# Patient Record
Sex: MALE | Race: WHITE | Employment: FULL TIME | URBAN - METROPOLITAN AREA
[De-identification: names, ages, dates, MRNs, and addresses within clinical notes are randomized per-mention and may not be internally consistent; named-entity substitution may affect disease eponyms.]

---

## 2020-12-08 ENCOUNTER — APPOINTMENT (OUTPATIENT)
Dept: CT IMAGING | Facility: CLINIC | Age: 30
DRG: 385 | End: 2020-12-08
Attending: EMERGENCY MEDICINE
Payer: COMMERCIAL

## 2020-12-08 ENCOUNTER — HOSPITAL ENCOUNTER (INPATIENT)
Facility: CLINIC | Age: 30
LOS: 10 days | Discharge: HOME | DRG: 385 | End: 2020-12-18
Attending: EMERGENCY MEDICINE | Admitting: STUDENT IN AN ORGANIZED HEALTH CARE EDUCATION/TRAINING PROGRAM
Payer: COMMERCIAL

## 2020-12-08 DIAGNOSIS — D62 ANEMIA DUE TO BLOOD LOSS, ACUTE: ICD-10-CM

## 2020-12-08 DIAGNOSIS — K92.2 GASTROINTESTINAL HEMORRHAGE, UNSPECIFIED GASTROINTESTINAL HEMORRHAGE TYPE: ICD-10-CM

## 2020-12-08 LAB
ALBUMIN SERPL-MCNC: 1.8 G/DL (ref 3.4–5)
ALP SERPL-CCNC: 106 U/L (ref 40–150)
ALT SERPL W P-5'-P-CCNC: 16 U/L (ref 0–70)
ANION GAP SERPL CALCULATED.3IONS-SCNC: 6 MMOL/L (ref 3–14)
APTT PPP: 34 SEC (ref 22–37)
AST SERPL W P-5'-P-CCNC: 12 U/L (ref 0–45)
BASE DEFICIT BLDV-SCNC: 2.5 MMOL/L
BASOPHILS # BLD AUTO: 0 10E9/L (ref 0–0.2)
BASOPHILS NFR BLD AUTO: 0.1 %
BILIRUB DIRECT SERPL-MCNC: <0.1 MG/DL (ref 0–0.2)
BILIRUB SERPL-MCNC: 0.1 MG/DL (ref 0.2–1.3)
BLD PROD TYP BPU: NORMAL
BLD PROD TYP BPU: NORMAL
BLD UNIT ID BPU: 0
BLD UNIT ID BPU: 0
BLOOD PRODUCT CODE: NORMAL
BLOOD PRODUCT CODE: NORMAL
BPU ID: NORMAL
BPU ID: NORMAL
BUN SERPL-MCNC: 17 MG/DL (ref 7–30)
CALCIUM SERPL-MCNC: 8.4 MG/DL (ref 8.5–10.1)
CHLORIDE SERPL-SCNC: 102 MMOL/L (ref 94–109)
CO2 SERPL-SCNC: 26 MMOL/L (ref 20–32)
CREAT SERPL-MCNC: 1.05 MG/DL (ref 0.66–1.25)
DIFFERENTIAL METHOD BLD: ABNORMAL
EOSINOPHIL # BLD AUTO: 0 10E9/L (ref 0–0.7)
EOSINOPHIL NFR BLD AUTO: 0.1 %
ERYTHROCYTE [DISTWIDTH] IN BLOOD BY AUTOMATED COUNT: 20.5 % (ref 10–15)
FERRITIN SERPL-MCNC: 2 NG/ML (ref 26–388)
GFR SERPL CREATININE-BSD FRML MDRD: >90 ML/MIN/{1.73_M2}
GLUCOSE SERPL-MCNC: 118 MG/DL (ref 70–99)
HCO3 BLDV-SCNC: 22 MMOL/L (ref 21–28)
HCT VFR BLD AUTO: 10.9 % (ref 40–53)
HEMOCCULT STL QL: POSITIVE
HGB BLD-MCNC: 3.1 G/DL (ref 13.3–17.7)
IMM GRANULOCYTES # BLD: 0.1 10E9/L (ref 0–0.4)
IMM GRANULOCYTES NFR BLD: 0.5 %
INR PPP: 1.1 (ref 0.86–1.14)
IRON SATN MFR SERPL: 2 % (ref 15–46)
IRON SERPL-MCNC: 8 UG/DL (ref 35–180)
LDH SERPL L TO P-CCNC: 144 U/L (ref 85–227)
LIPASE SERPL-CCNC: 29 U/L (ref 73–393)
LYMPHOCYTES # BLD AUTO: 1.7 10E9/L (ref 0.8–5.3)
LYMPHOCYTES NFR BLD AUTO: 13.5 %
MCH RBC QN AUTO: 19 PG (ref 26.5–33)
MCHC RBC AUTO-ENTMCNC: 28.4 G/DL (ref 31.5–36.5)
MCV RBC AUTO: 67 FL (ref 78–100)
MONOCYTES # BLD AUTO: 1.4 10E9/L (ref 0–1.3)
MONOCYTES NFR BLD AUTO: 10.7 %
NEUTROPHILS # BLD AUTO: 9.6 10E9/L (ref 1.6–8.3)
NEUTROPHILS NFR BLD AUTO: 75.1 %
NRBC # BLD AUTO: 0.2 10*3/UL
NRBC BLD AUTO-RTO: 2 /100
O2/TOTAL GAS SETTING VFR VENT: ABNORMAL %
PCO2 BLDV: 34 MM HG (ref 40–50)
PH BLDV: 7.42 PH (ref 7.32–7.43)
PLATELET # BLD AUTO: 622 10E9/L (ref 150–450)
PO2 BLDV: 17 MM HG (ref 25–47)
POTASSIUM SERPL-SCNC: 3.9 MMOL/L (ref 3.4–5.3)
PROT SERPL-MCNC: 6.9 G/DL (ref 6.8–8.8)
RBC # BLD AUTO: 1.63 10E12/L (ref 4.4–5.9)
SODIUM SERPL-SCNC: 134 MMOL/L (ref 133–144)
TIBC SERPL-MCNC: 347 UG/DL (ref 240–430)
TRANSFUSION STATUS PATIENT QL: NORMAL
WBC # BLD AUTO: 12.8 10E9/L (ref 4–11)

## 2020-12-08 PROCEDURE — 86900 BLOOD TYPING SEROLOGIC ABO: CPT | Performed by: EMERGENCY MEDICINE

## 2020-12-08 PROCEDURE — 82272 OCCULT BLD FECES 1-3 TESTS: CPT | Performed by: EMERGENCY MEDICINE

## 2020-12-08 PROCEDURE — 85025 COMPLETE CBC W/AUTO DIFF WBC: CPT | Performed by: EMERGENCY MEDICINE

## 2020-12-08 PROCEDURE — 86870 RBC ANTIBODY IDENTIFICATION: CPT | Performed by: EMERGENCY MEDICINE

## 2020-12-08 PROCEDURE — 86905 BLOOD TYPING RBC ANTIGENS: CPT | Performed by: EMERGENCY MEDICINE

## 2020-12-08 PROCEDURE — 86880 COOMBS TEST DIRECT: CPT | Performed by: EMERGENCY MEDICINE

## 2020-12-08 PROCEDURE — 83010 ASSAY OF HAPTOGLOBIN QUANT: CPT | Performed by: STUDENT IN AN ORGANIZED HEALTH CARE EDUCATION/TRAINING PROGRAM

## 2020-12-08 PROCEDURE — 85730 THROMBOPLASTIN TIME PARTIAL: CPT | Performed by: EMERGENCY MEDICINE

## 2020-12-08 PROCEDURE — 86902 BLOOD TYPE ANTIGEN DONOR EA: CPT | Performed by: EMERGENCY MEDICINE

## 2020-12-08 PROCEDURE — 80076 HEPATIC FUNCTION PANEL: CPT | Performed by: EMERGENCY MEDICINE

## 2020-12-08 PROCEDURE — 87506 IADNA-DNA/RNA PROBE TQ 6-11: CPT | Performed by: STUDENT IN AN ORGANIZED HEALTH CARE EDUCATION/TRAINING PROGRAM

## 2020-12-08 PROCEDURE — 99285 EMERGENCY DEPT VISIT HI MDM: CPT | Mod: 25

## 2020-12-08 PROCEDURE — 36430 TRANSFUSION BLD/BLD COMPNT: CPT

## 2020-12-08 PROCEDURE — 83690 ASSAY OF LIPASE: CPT | Performed by: EMERGENCY MEDICINE

## 2020-12-08 PROCEDURE — 250N000009 HC RX 250: Performed by: EMERGENCY MEDICINE

## 2020-12-08 PROCEDURE — 80048 BASIC METABOLIC PNL TOTAL CA: CPT | Performed by: EMERGENCY MEDICINE

## 2020-12-08 PROCEDURE — 83540 ASSAY OF IRON: CPT | Performed by: EMERGENCY MEDICINE

## 2020-12-08 PROCEDURE — 74177 CT ABD & PELVIS W/CONTRAST: CPT

## 2020-12-08 PROCEDURE — U0003 INFECTIOUS AGENT DETECTION BY NUCLEIC ACID (DNA OR RNA); SEVERE ACUTE RESPIRATORY SYNDROME CORONAVIRUS 2 (SARS-COV-2) (CORONAVIRUS DISEASE [COVID-19]), AMPLIFIED PROBE TECHNIQUE, MAKING USE OF HIGH THROUGHPUT TECHNOLOGIES AS DESCRIBED BY CMS-2020-01-R: HCPCS | Performed by: EMERGENCY MEDICINE

## 2020-12-08 PROCEDURE — 85610 PROTHROMBIN TIME: CPT | Performed by: EMERGENCY MEDICINE

## 2020-12-08 PROCEDURE — 86850 RBC ANTIBODY SCREEN: CPT | Performed by: EMERGENCY MEDICINE

## 2020-12-08 PROCEDURE — 82803 BLOOD GASES ANY COMBINATION: CPT | Performed by: EMERGENCY MEDICINE

## 2020-12-08 PROCEDURE — 99207 PR CDG-MDM COMPONENT: MEETS MODERATE - UP CODED: CPT | Performed by: STUDENT IN AN ORGANIZED HEALTH CARE EDUCATION/TRAINING PROGRAM

## 2020-12-08 PROCEDURE — 86901 BLOOD TYPING SEROLOGIC RH(D): CPT | Performed by: EMERGENCY MEDICINE

## 2020-12-08 PROCEDURE — 120N000001 HC R&B MED SURG/OB

## 2020-12-08 PROCEDURE — 82728 ASSAY OF FERRITIN: CPT | Performed by: EMERGENCY MEDICINE

## 2020-12-08 PROCEDURE — 99223 1ST HOSP IP/OBS HIGH 75: CPT | Mod: AI | Performed by: STUDENT IN AN ORGANIZED HEALTH CARE EDUCATION/TRAINING PROGRAM

## 2020-12-08 PROCEDURE — 86922 COMPATIBILITY TEST ANTIGLOB: CPT | Performed by: EMERGENCY MEDICINE

## 2020-12-08 PROCEDURE — C9803 HOPD COVID-19 SPEC COLLECT: HCPCS

## 2020-12-08 PROCEDURE — 83550 IRON BINDING TEST: CPT | Performed by: EMERGENCY MEDICINE

## 2020-12-08 PROCEDURE — 250N000011 HC RX IP 250 OP 636: Performed by: EMERGENCY MEDICINE

## 2020-12-08 PROCEDURE — 86860 RBC ANTIBODY ELUTION: CPT | Performed by: EMERGENCY MEDICINE

## 2020-12-08 PROCEDURE — 83010 ASSAY OF HAPTOGLOBIN QUANT: CPT | Performed by: EMERGENCY MEDICINE

## 2020-12-08 PROCEDURE — 83615 LACTATE (LD) (LDH) ENZYME: CPT | Performed by: EMERGENCY MEDICINE

## 2020-12-08 PROCEDURE — P9016 RBC LEUKOCYTES REDUCED: HCPCS | Performed by: EMERGENCY MEDICINE

## 2020-12-08 RX ORDER — AMOXICILLIN 250 MG
1 CAPSULE ORAL 2 TIMES DAILY
Status: DISCONTINUED | OUTPATIENT
Start: 2020-12-08 | End: 2020-12-08

## 2020-12-08 RX ORDER — LIDOCAINE 40 MG/G
CREAM TOPICAL
Status: DISCONTINUED | OUTPATIENT
Start: 2020-12-08 | End: 2020-12-18 | Stop reason: HOSPADM

## 2020-12-08 RX ORDER — NALOXONE HYDROCHLORIDE 0.4 MG/ML
0.2 INJECTION, SOLUTION INTRAMUSCULAR; INTRAVENOUS; SUBCUTANEOUS
Status: DISCONTINUED | OUTPATIENT
Start: 2020-12-08 | End: 2020-12-18 | Stop reason: HOSPADM

## 2020-12-08 RX ORDER — HYDROCODONE BITARTRATE AND ACETAMINOPHEN 5; 325 MG/1; MG/1
1-2 TABLET ORAL EVERY 4 HOURS PRN
Status: DISCONTINUED | OUTPATIENT
Start: 2020-12-08 | End: 2020-12-18 | Stop reason: HOSPADM

## 2020-12-08 RX ORDER — ALBUMIN (HUMAN) 12.5 G/50ML
25 SOLUTION INTRAVENOUS ONCE
Status: COMPLETED | OUTPATIENT
Start: 2020-12-08 | End: 2020-12-09

## 2020-12-08 RX ORDER — IOPAMIDOL 755 MG/ML
66 INJECTION, SOLUTION INTRAVASCULAR ONCE
Status: COMPLETED | OUTPATIENT
Start: 2020-12-08 | End: 2020-12-08

## 2020-12-08 RX ORDER — NALOXONE HYDROCHLORIDE 0.4 MG/ML
0.4 INJECTION, SOLUTION INTRAMUSCULAR; INTRAVENOUS; SUBCUTANEOUS
Status: DISCONTINUED | OUTPATIENT
Start: 2020-12-08 | End: 2020-12-18 | Stop reason: HOSPADM

## 2020-12-08 RX ORDER — AMOXICILLIN 250 MG
2 CAPSULE ORAL 2 TIMES DAILY
Status: DISCONTINUED | OUTPATIENT
Start: 2020-12-08 | End: 2020-12-08

## 2020-12-08 RX ORDER — POLYETHYLENE GLYCOL 3350 17 G/17G
17 POWDER, FOR SOLUTION ORAL DAILY
Status: DISCONTINUED | OUTPATIENT
Start: 2020-12-09 | End: 2020-12-08

## 2020-12-08 RX ADMIN — SODIUM CHLORIDE 60 ML: 9 INJECTION, SOLUTION INTRAVENOUS at 17:42

## 2020-12-08 RX ADMIN — IOPAMIDOL 66 ML: 755 INJECTION, SOLUTION INTRAVENOUS at 17:42

## 2020-12-08 ASSESSMENT — ENCOUNTER SYMPTOMS
DIZZINESS: 0
HEADACHES: 0
FREQUENCY: 0
SHORTNESS OF BREATH: 0
APPETITE CHANGE: 1
FATIGUE: 1
CHILLS: 1
NAUSEA: 1
FEVER: 0
FLANK PAIN: 0
ABDOMINAL PAIN: 0
VOMITING: 0
ACTIVITY CHANGE: 1
DIARRHEA: 1
LIGHT-HEADEDNESS: 0

## 2020-12-08 NOTE — ED NOTES
DATE:  12/8/2020   TIME OF RECEIPT FROM LAB:  2224   LAB TEST:  Antibody screen  LAB VALUE:  Positive antibody screen  RESULTS GIVEN WITH READ-BACK TO (PROVIDER):  Payam Sampson,   TIME LAB VALUE REPORTED TO PROVIDER:   4882

## 2020-12-08 NOTE — ED TRIAGE NOTES
Patient states he had a hemoglobin of 3 today. States he has some hemorrhoids that bleed at times but denies dark, tarry stools.

## 2020-12-08 NOTE — ED PROVIDER NOTES
History   Chief Complaint  Black diarrheal stools    HPI   Chris Siddiqi is a 30 year old male with a history of pancreatic insufficiency who presents for evaluation of low hemoglobin. Patient has been followed by [facility] due to chronic pancreatitis and had a virtual visit on 11/18 and then again today. The patient has had black, diarrheal stools since 11/16 (3 weeks and one day), with up to 20 episodes of diarrhea per day. He states that he has had progressive weakness and fatigue over this time period. No fevers but has had chills. No chest pain or shortness of breath. He has also noticed that his eyelids and mouth are pale. Patient was told to go to get labs done after his virtual visit and his hemoglobin came back at 3.1    Labs from 12/8/2020 at [facility]:  CBC: WBC 12.8 (H), HGB 3. 1 (L),  (H) o/w WNL    Allergies  No Known Allergies    Medications  The patient is not currently taking any prescribed medications.    Past Medical History  Pancreatic insufficiency    Past Surgical History   History reviewed.  No pertinent past surgical history.    Family History  History reviewed. No pertinent family history.    Social History  The patient was unaccompanied to the ED.  Smoking Status: never  Smokeless Tobacco: never  Alcohol Use: yes  Drug Use: no    Review of Systems   Constitutional: Positive for activity change, appetite change, chills and fatigue. Negative for fever.   HENT: Negative for congestion.    Respiratory: Negative for shortness of breath.    Cardiovascular: Negative for chest pain.   Gastrointestinal: Positive for diarrhea and nausea. Negative for abdominal pain and vomiting.   Genitourinary: Negative for flank pain, frequency and urgency.   Neurological: Negative for dizziness, light-headedness and headaches.       Physical Exam     Patient Vitals for the past 24 hrs:   BP Temp Temp src Pulse Resp SpO2   12/08/20 1845 114/59 -- -- 107 18 100 %   12/08/20 1830 -- -- -- 116 12 100 %   12/08/20  1815 109/62 -- -- 110 12 100 %   12/08/20 1800 118/65 -- -- 109 12 100 %   12/08/20 1730 115/63 -- -- 109 9 96 %   12/08/20 1715 124/47 -- -- 140 -- --   12/08/20 1700 107/61 -- -- 112 -- 100 %   12/08/20 1643 113/40 98.3  F (36.8  C) Oral 138 18 97 %       Physical Exam  Constitutional: Alert, appears ill and quite pale.   HENT:    Nose: Nose normal.    Mouth/Throat: Oropharynx is clear, mucous membranes are moist, pale mucosa.   Eyes: Pale conjunctivae. EOM are normal. Pupils are equal, round, and reactive to light.   CV: Tachycardic rate and regular rhythm, no murmurs, rubs or gallops.  Resp: Clear lungs to auscultation, all lung fields. Normal respiratory effort.   GI: Mild diffuse abdominal tenderness. Soft, non-distended. No rebound or guarding.   MSK: Normal range of motion. No deformity.   Neurological:   A/Ox3  5/5 strength is symmetric to the upper and lower extremities;   Sensation intact to light touch throughout the upper and lower extremities;   Skin: Skin is warm and dry.    Emergency Department Course   Imaging:  Radiology findings were communicated with the patient who voiced understanding of the findings.    CT abdomen pelvis w contrast:  IMPRESSION:   1. Although the colon is not distended, there is diffuse colonic wall   thickening, which can be seen with colitis.   2. Multiple bibasilar pulmonary nodules, of indeterminate etiology,   could be infectious or less likely neoplastic given the lack of   patient's history of malignancy. Recommend follow-up exam in 3 months   to ensure resolution.   3. Atrophic appearance of the pancreas of indeterminate etiology.     Readings per Radiology    Laboratory:  Laboratory findings were communicated with the patient who voiced understanding of the findings.    PTT: 34  Lipase: 29 (L)  INR: 1.10     Hepatic panel: bilirubin 0.1 (L), albumin 1.8 (L) o/w WNL    Blood type: type A, Rh positive, antibody positive    BMP: glucose 118 (H), calcium 8.4 (L) o/w WNL  (Creatinine 1.05)  CBC: WBC 12.8 (H), HGB 3.1 (L),  (H)     Occult blood stool: positive    BGV: pCO2 34 (L), pO2 17 (L) o/w WNL    Interventions:  2 units of typed and crossed PRBC's    Emergency Department Course:  Past medical records, nursing notes, and vitals reviewed.    1646 I physically examined the patient as documented above.    A Nasopharyngeal swab was obtained here in the ED.    IV was inserted and blood was drawn for laboratory testing, results above.    The patient was sent for radiographs while in the emergency department, results above.     I rechecked the patient and discussed the findings of their workup thus far.    183 I spoke to Dr. Santiago of hospitalist service.      Findings and plan explained to the Patient who consents to admission. Discussed the patient with Dr. Santiago, who will admit the patient to a [facility] telemetry bed for further monitoring, evaluation, and treatment.    I personally reviewed the laboratory and imaging results with the Patient and answered all related questions prior to admission.     Impression & Plan   Covid-19  Chris Siddiqi was evaluated during a global COVID-19 pandemic, which necessitated consideration that the patient might be at risk for infection with the SARS-CoV-2 virus that causes COVID-19.   Applicable protocols for evaluation were followed during the patient's care.   COVID-19 was considered as part of the patient's evaluation. The plan for testing is:  a test was obtained during this visit.    Medical Decision Makin year old male who comes in with low hemoglobin. Exam as above. Labs obtained. Hemoglobin 3.1 again demonstrated. Blood pressure 114/59 and so I did not start uncrossed blood but waited for type and screen. It was positive for antibiotics and the blood was then started. We started 2 U PRBC once it was available. CT abdomen/pelvis did not demonstrate any acute bleeding but did show colitis which is likely the source given  his positive hemoccult. This is likely a slow bleed given his compensated shock state. Discussed the patient with Dr. Santiago who was amenable to admission.    Critical care time exclusive of procedures: 37 minutes    Diagnosis:    ICD-10-CM    1. Gastrointestinal hemorrhage, unspecified gastrointestinal hemorrhage type  K92.2 ABO/Rh type and screen     Blood component     Blood component     Blood component     Blood component     Antibody ID [facility] BB     Antibody ID [facility] BB   2. Anemia due to blood loss, acute  D62      Disposition:  Admitted to [facility] under care of Dr. Santiago.    Scribe Disclosure:  Tata ESPINOZA, am serving as a scribe at 4:44 PM on 12/8/2020 to document services personally performed by Payam Sampson DO based on my observations and the provider's statements to me.      Payam Sampson DO  12/08/20 1930

## 2020-12-08 NOTE — ED NOTES
"Pt lined in triage and blood sent for analysis asap. Pt presents with very pale skin, pale mucous membranes, and weak/tired.   Pt had gone to GI clinic today and had labs drawn with a reported HGB of 3.1 Pt lives alone, no sig other, last physical contact where someone has seen him hs been 1 month ago. I asked pt how long he has been so pale to which he replied \" I did not know that I was pale. \"     Pt stated no outward s/s of bleeding other than minor hemorrhoids to which he was seeing GI for he stated , pt also has some sort of poor working pancreas per report. VSS in lieu of tachy    "

## 2020-12-09 ENCOUNTER — ANESTHESIA (OUTPATIENT)
Dept: ONCOLOGY | Facility: CLINIC | Age: 30
End: 2020-12-09

## 2020-12-09 LAB
ABO + RH BLD: ABNORMAL
ABO + RH BLD: ABNORMAL
ABO + RH BLD: NORMAL
ABO + RH BLD: NORMAL
ALBUMIN SERPL-MCNC: 1.8 G/DL (ref 3.4–5)
ALP SERPL-CCNC: 81 U/L (ref 40–150)
ALT SERPL W P-5'-P-CCNC: 12 U/L (ref 0–70)
ANION GAP SERPL CALCULATED.3IONS-SCNC: 3 MMOL/L (ref 3–14)
AST SERPL W P-5'-P-CCNC: 13 U/L (ref 0–45)
BASOPHILS # BLD AUTO: 0 10E9/L (ref 0–0.2)
BASOPHILS NFR BLD AUTO: 0.2 %
BILIRUB SERPL-MCNC: 0.4 MG/DL (ref 0.2–1.3)
BLD GP AB INVEST PLASRBC-IMP: ABNORMAL
BLD GP AB INVEST PLASRBC-IMP: NORMAL
BLD GP AB SCN SERPL QL ELUTION: NORMAL
BLD GP AB SCN SERPL QL: ABNORMAL
BLD PROD TYP BPU: ABNORMAL
BLD PROD TYP BPU: NORMAL
BLD PROD TYP BPU: NORMAL
BLD UNIT ID BPU: 0
BLD UNIT ID BPU: 0
BLOOD BANK CMNT PATIENT-IMP: ABNORMAL
BLOOD BANK CMNT PATIENT-IMP: ABNORMAL
BLOOD BANK CMNT PATIENT-IMP: NORMAL
BLOOD BANK CMNT PATIENT-IMP: NORMAL
BLOOD PRODUCT CODE: NORMAL
BLOOD PRODUCT CODE: NORMAL
BPU ID: NORMAL
BPU ID: NORMAL
BUN SERPL-MCNC: 11 MG/DL (ref 7–30)
C COLI+JEJUNI+LARI FUSA STL QL NAA+PROBE: NOT DETECTED
CALCIUM SERPL-MCNC: 7.8 MG/DL (ref 8.5–10.1)
CHLORIDE SERPL-SCNC: 104 MMOL/L (ref 94–109)
CO2 SERPL-SCNC: 26 MMOL/L (ref 20–32)
CREAT SERPL-MCNC: 0.93 MG/DL (ref 0.66–1.25)
CRP SERPL-MCNC: 24.6 MG/L (ref 0–8)
DAT C3-SP REAG RBC QL: NORMAL
DAT IGG-SP REAG RBC-IMP: ABNORMAL
DAT IGG-SP REAG RBC-IMP: NORMAL
DAT POLY-SP REAG RBC QL: NORMAL
DIFFERENTIAL METHOD BLD: ABNORMAL
EC STX1 GENE STL QL NAA+PROBE: NOT DETECTED
EC STX2 GENE STL QL NAA+PROBE: NOT DETECTED
ENTERIC PATHOGEN COMMENT: NORMAL
EOSINOPHIL # BLD AUTO: 0 10E9/L (ref 0–0.7)
EOSINOPHIL NFR BLD AUTO: 0.2 %
ERYTHROCYTE [DISTWIDTH] IN BLOOD BY AUTOMATED COUNT: 20.4 % (ref 10–15)
FERRITIN SERPL-MCNC: 3 NG/ML (ref 26–388)
FOLATE SERPL-MCNC: 25.7 NG/ML
GFR SERPL CREATININE-BSD FRML MDRD: >90 ML/MIN/{1.73_M2}
GLUCOSE SERPL-MCNC: 91 MG/DL (ref 70–99)
HAPTOGLOB SERPL-MCNC: 433 MG/DL (ref 32–197)
HCT VFR BLD AUTO: 13.9 % (ref 40–53)
HGB BLD-MCNC: 4.5 G/DL (ref 13.3–17.7)
HGB BLD-MCNC: 7.4 G/DL (ref 13.3–17.7)
HGB BLD-MCNC: 8.5 G/DL (ref 13.3–17.7)
IMM GRANULOCYTES # BLD: 0 10E9/L (ref 0–0.4)
IMM GRANULOCYTES NFR BLD: 0.5 %
IRON SATN MFR SERPL: 4 % (ref 15–46)
IRON SERPL-MCNC: 10 UG/DL (ref 35–180)
LABORATORY COMMENT REPORT: NORMAL
LDH SERPL L TO P-CCNC: 121 U/L (ref 85–227)
LYMPHOCYTES # BLD AUTO: 1.5 10E9/L (ref 0.8–5.3)
LYMPHOCYTES NFR BLD AUTO: 17.9 %
MCH RBC QN AUTO: 23.8 PG (ref 26.5–33)
MCHC RBC AUTO-ENTMCNC: 32.4 G/DL (ref 31.5–36.5)
MCV RBC AUTO: 74 FL (ref 78–100)
MONOCYTES # BLD AUTO: 1 10E9/L (ref 0–1.3)
MONOCYTES NFR BLD AUTO: 12.5 %
NEUTROPHILS # BLD AUTO: 5.7 10E9/L (ref 1.6–8.3)
NEUTROPHILS NFR BLD AUTO: 68.7 %
NOROV GI+II ORF1-ORF2 JNC STL QL NAA+PR: NOT DETECTED
NRBC # BLD AUTO: 0.1 10*3/UL
NRBC BLD AUTO-RTO: 1 /100
NUM BPU REQUESTED: 9
PLATELET # BLD AUTO: 399 10E9/L (ref 150–450)
POTASSIUM SERPL-SCNC: 4.1 MMOL/L (ref 3.4–5.3)
PROT SERPL-MCNC: 5.6 G/DL (ref 6.8–8.8)
RBC # BLD AUTO: 1.89 10E12/L (ref 4.4–5.9)
RVA NSP5 STL QL NAA+PROBE: NOT DETECTED
SALMONELLA SP RPOD STL QL NAA+PROBE: NOT DETECTED
SARS-COV-2 RNA SPEC QL NAA+PROBE: NEGATIVE
SARS-COV-2 RNA SPEC QL NAA+PROBE: NORMAL
SHIGELLA SP+EIEC IPAH STL QL NAA+PROBE: NOT DETECTED
SODIUM SERPL-SCNC: 133 MMOL/L (ref 133–144)
SPECIMEN EXP DATE BLD: ABNORMAL
SPECIMEN SOURCE: NORMAL
SPECIMEN SOURCE: NORMAL
TIBC SERPL-MCNC: 264 UG/DL (ref 240–430)
TRANSFUSION STATUS PATIENT QL: NORMAL
V CHOL+PARA RFBL+TRKH+TNAA STL QL NAA+PR: NOT DETECTED
VIT B12 SERPL-MCNC: 987 PG/ML (ref 193–986)
WBC # BLD AUTO: 8.3 10E9/L (ref 4–11)
Y ENTERO RECN STL QL NAA+PROBE: NOT DETECTED

## 2020-12-09 PROCEDURE — 82607 VITAMIN B-12: CPT | Performed by: INTERNAL MEDICINE

## 2020-12-09 PROCEDURE — 250N000013 HC RX MED GY IP 250 OP 250 PS 637: Performed by: INTERNAL MEDICINE

## 2020-12-09 PROCEDURE — 93010 ELECTROCARDIOGRAM REPORT: CPT | Performed by: INTERNAL MEDICINE

## 2020-12-09 PROCEDURE — 93005 ELECTROCARDIOGRAM TRACING: CPT

## 2020-12-09 PROCEDURE — 99233 SBSQ HOSP IP/OBS HIGH 50: CPT | Performed by: HOSPITALIST

## 2020-12-09 PROCEDURE — 85018 HEMOGLOBIN: CPT | Performed by: HOSPITALIST

## 2020-12-09 PROCEDURE — 83615 LACTATE (LD) (LDH) ENZYME: CPT | Performed by: STUDENT IN AN ORGANIZED HEALTH CARE EDUCATION/TRAINING PROGRAM

## 2020-12-09 PROCEDURE — 36415 COLL VENOUS BLD VENIPUNCTURE: CPT | Performed by: INTERNAL MEDICINE

## 2020-12-09 PROCEDURE — 36415 COLL VENOUS BLD VENIPUNCTURE: CPT | Performed by: HOSPITALIST

## 2020-12-09 PROCEDURE — 250N000011 HC RX IP 250 OP 636: Performed by: STUDENT IN AN ORGANIZED HEALTH CARE EDUCATION/TRAINING PROGRAM

## 2020-12-09 PROCEDURE — 36415 COLL VENOUS BLD VENIPUNCTURE: CPT | Performed by: STUDENT IN AN ORGANIZED HEALTH CARE EDUCATION/TRAINING PROGRAM

## 2020-12-09 PROCEDURE — 82746 ASSAY OF FOLIC ACID SERUM: CPT | Performed by: INTERNAL MEDICINE

## 2020-12-09 PROCEDURE — 85025 COMPLETE CBC W/AUTO DIFF WBC: CPT | Performed by: HOSPITALIST

## 2020-12-09 PROCEDURE — P9047 ALBUMIN (HUMAN), 25%, 50ML: HCPCS | Performed by: STUDENT IN AN ORGANIZED HEALTH CARE EDUCATION/TRAINING PROGRAM

## 2020-12-09 PROCEDURE — 120N000001 HC R&B MED SURG/OB

## 2020-12-09 PROCEDURE — 250N000011 HC RX IP 250 OP 636: Performed by: HOSPITALIST

## 2020-12-09 PROCEDURE — C9113 INJ PANTOPRAZOLE SODIUM, VIA: HCPCS | Performed by: HOSPITALIST

## 2020-12-09 PROCEDURE — 82728 ASSAY OF FERRITIN: CPT | Performed by: STUDENT IN AN ORGANIZED HEALTH CARE EDUCATION/TRAINING PROGRAM

## 2020-12-09 PROCEDURE — 83550 IRON BINDING TEST: CPT | Performed by: STUDENT IN AN ORGANIZED HEALTH CARE EDUCATION/TRAINING PROGRAM

## 2020-12-09 PROCEDURE — 85018 HEMOGLOBIN: CPT | Performed by: INTERNAL MEDICINE

## 2020-12-09 PROCEDURE — P9016 RBC LEUKOCYTES REDUCED: HCPCS | Performed by: EMERGENCY MEDICINE

## 2020-12-09 PROCEDURE — 83540 ASSAY OF IRON: CPT | Performed by: STUDENT IN AN ORGANIZED HEALTH CARE EDUCATION/TRAINING PROGRAM

## 2020-12-09 PROCEDURE — 80053 COMPREHEN METABOLIC PANEL: CPT | Performed by: STUDENT IN AN ORGANIZED HEALTH CARE EDUCATION/TRAINING PROGRAM

## 2020-12-09 PROCEDURE — 86140 C-REACTIVE PROTEIN: CPT | Performed by: STUDENT IN AN ORGANIZED HEALTH CARE EDUCATION/TRAINING PROGRAM

## 2020-12-09 PROCEDURE — 250N000013 HC RX MED GY IP 250 OP 250 PS 637: Performed by: STUDENT IN AN ORGANIZED HEALTH CARE EDUCATION/TRAINING PROGRAM

## 2020-12-09 RX ORDER — ONDANSETRON 2 MG/ML
4 INJECTION INTRAMUSCULAR; INTRAVENOUS EVERY 30 MIN PRN
Status: CANCELLED | OUTPATIENT
Start: 2020-12-09

## 2020-12-09 RX ORDER — MULTIPLE VITAMINS W/ MINERALS TAB 9MG-400MCG
1 TAB ORAL DAILY
Status: DISCONTINUED | OUTPATIENT
Start: 2020-12-09 | End: 2020-12-18 | Stop reason: HOSPADM

## 2020-12-09 RX ORDER — ONDANSETRON 4 MG/1
4 TABLET, ORALLY DISINTEGRATING ORAL EVERY 30 MIN PRN
Status: CANCELLED | OUTPATIENT
Start: 2020-12-09

## 2020-12-09 RX ORDER — POLYETHYLENE GLYCOL 3350 17 G/17G
238 POWDER, FOR SOLUTION ORAL ONCE
Status: COMPLETED | OUTPATIENT
Start: 2020-12-09 | End: 2020-12-09

## 2020-12-09 RX ORDER — SODIUM CHLORIDE, SODIUM LACTATE, POTASSIUM CHLORIDE, CALCIUM CHLORIDE 600; 310; 30; 20 MG/100ML; MG/100ML; MG/100ML; MG/100ML
INJECTION, SOLUTION INTRAVENOUS CONTINUOUS
Status: CANCELLED | OUTPATIENT
Start: 2020-12-09

## 2020-12-09 RX ORDER — LIDOCAINE 40 MG/G
CREAM TOPICAL
Status: CANCELLED | OUTPATIENT
Start: 2020-12-09

## 2020-12-09 RX ORDER — MAGNESIUM CARB/ALUMINUM HYDROX 105-160MG
296 TABLET,CHEWABLE ORAL ONCE
Status: COMPLETED | OUTPATIENT
Start: 2020-12-10 | End: 2020-12-09

## 2020-12-09 RX ORDER — FENTANYL CITRATE 50 UG/ML
25-50 INJECTION, SOLUTION INTRAMUSCULAR; INTRAVENOUS
Status: CANCELLED | OUTPATIENT
Start: 2020-12-09

## 2020-12-09 RX ADMIN — Medication 1 MG: at 03:29

## 2020-12-09 RX ADMIN — POLYETHYLENE GLYCOL 3350, SODIUM SULFATE ANHYDROUS, SODIUM BICARBONATE, SODIUM CHLORIDE, POTASSIUM CHLORIDE 4000 ML: 236; 22.74; 6.74; 5.86; 2.97 POWDER, FOR SOLUTION ORAL at 09:49

## 2020-12-09 RX ADMIN — ALBUMIN HUMAN 25 G: 0.25 SOLUTION INTRAVENOUS at 01:57

## 2020-12-09 RX ADMIN — MAGNESIUM CITRATE 296 ML: 1.75 LIQUID ORAL at 23:55

## 2020-12-09 RX ADMIN — PANTOPRAZOLE SODIUM 40 MG: 40 INJECTION, POWDER, FOR SOLUTION INTRAVENOUS at 09:49

## 2020-12-09 RX ADMIN — Medication 1 MG: at 23:55

## 2020-12-09 RX ADMIN — POLYETHYLENE GLYCOL 3350 238 G: 17 POWDER, FOR SOLUTION ORAL at 14:09

## 2020-12-09 RX ADMIN — PANTOPRAZOLE SODIUM 40 MG: 40 INJECTION, POWDER, FOR SOLUTION INTRAVENOUS at 21:16

## 2020-12-09 ASSESSMENT — ACTIVITIES OF DAILY LIVING (ADL)
ADLS_ACUITY_SCORE: 16
ADLS_ACUITY_SCORE: 17
ADLS_ACUITY_SCORE: 16

## 2020-12-09 ASSESSMENT — LIFESTYLE VARIABLES: TOBACCO_USE: 0

## 2020-12-09 ASSESSMENT — MIFFLIN-ST. JEOR: SCORE: 1447.06

## 2020-12-09 ASSESSMENT — COPD QUESTIONNAIRES: COPD: 0

## 2020-12-09 NOTE — PROGRESS NOTES
[facility]    Medicine Progress Note - Hospitalist Service       Date of Admission:  12/8/2020  Assessment & Plan            Profound microcytic anemia, likely Fe deficiency  Colitis  Numerous bowel movements since October accompanied by 8 lbs weight loss  He did not notice bleeding  likely slow chronic blood loss anemia however will order LDH and haptoglobin to r/o lysis  CT imaging with diffuse colonic wall thickening suggestive of colitis  typed for 2 units of blood in the ED, which the nursing reports transfused without difficulty overnight  Small amount of blood striking in his stools  plan  - [facility] consulted  - npo this AM, needs endoscopies.  - stat hgb, transfuse if < 7 (will give 3 units this AM, attempt EGD/colon this afternoon if > 7)  - follow-up enteric panel  - q6 hours hgb until consistently above 7  - doubt upper given blood streaking, but low risk for ppi, will start.     Pancreatitis insufficency  [facility] may have more information but patient reports episode of pancreatitis a few years ago (he was told it was pancreatitis) with resulting pancreatitic insufficiency  -he denies any known etiology for his pancreatitis, ? CF  -atrophic pancreatitis on imaging  -continue PTA Creon     Hypoalbuminemia  Albumin of 1.8  possibly related to malnutrition and/or chronic diarrhea  Plan  - albumin prn, but at this point he likely still needs blood  -consult nutrition     Pulmonary nodules  -unclear etiology  -will need 3 month follow up CT, defer to PCP     Diet: Combination Diet Regular Diet Adult    DVT Prophylaxis: Pneumatic Compression Devices  Alexander Catheter: not present  Code Status: Full Code           Disposition Plan   Expected discharge: 2 - 3 days, recommended to prior living arrangement once hemoglobin stable.  Entered: Esteban Quigley DO 12/09/2020, 7:29 AM       The patient's care was discussed with the Patient.    Esteban Quigley DO  Hospitalist Service  [facility]  [facility]  Contact information available via [facility] Paging/Directory    ______________________________________________________________________    Interval History   Endorses poor sleep. Does not feel better, but is hungry. 8lb weight loss    Data reviewed today: I reviewed all medications, new labs and imaging results over the last 24 hours. I personally reviewed   CT with colitis and pulmonary nodules in bilateral bases.  EKG NSR    Physical Exam   Vital Signs: Temp: 99  F (37.2  C) Temp src: Oral BP: 111/65 Pulse: 87   Resp: 16 SpO2: 97 % O2 Device: None (Room air)    Weight: 0 lbs 0 oz  Constitutional: Awake, alert, cooperative, very pale, thin and weak  Eyes: Conjunctiva and pupils examined and normal.  HEENT: dry, mucous membrane  Respiratory: Clear to auscultation bilaterally, no crackles or wheezing.  Cardiovascular: tachycardic regular rhythm, normal S1 and S2, and no murmur noted.  GI: Soft, non-distended, non-tender, normal bowel sounds.  Lymph/Hematologic: No anterior cervical or supraclavicular adenopathy.  Skin: No rashes, no cyanosis, no edema.  Musculoskeletal: No joint swelling, erythema or tenderness.  Neurologic: Cranial nerves 2-12 intact, normal strength and sensation.  Psychiatric: Alert, oriented to person, place and time, flat affect       Data   Recent Labs   Lab 12/09/20  0750 12/08/20  1640   WBC 8.3 12.8*   HGB 4.5* 3.1*   MCV 74* 67*    622*   INR  --  1.10   NA  --  134   POTASSIUM  --  3.9   CHLORIDE  --  102   CO2  --  26   BUN  --  17   CR  --  1.05   ANIONGAP  --  6   KIARA  --  8.4*   GLC  --  118*   ALBUMIN  --  1.8*   PROTTOTAL  --  6.9   BILITOTAL  --  0.1*   ALKPHOS  --  106   ALT  --  16   AST  --  12   LIPASE  --  29*     Recent Results (from the past 24 hour(s))   CT Abdomen Pelvis w Contrast    Narrative    CT ABDOMEN PELVIS WITH CONTRAST   12/8/2020 5:50 PM     HISTORY: Abdominal pain, acute, generalized.    TECHNIQUE:  CT abdomen and pelvis with 66mL  Isovue-370 IV. Radiation  dose for this scan was reduced using automated exposure control,  adjustment of the mA and/or kV according to patient size, or iterative  reconstruction technique.    COMPARISON: None available    FINDINGS:  Lower chest: Few bibasilar nodular pulmonary opacities, could be  infectious including 8 mm left lower lobe subpleural nodule (series 4  image 22) and 1.1 cm right middle lobe subpleural nodule (series 4  image 29).    Abdomen/pelvis: No suspicious focal hepatic lesion. The gallbladder is  unremarkable. Atrophic appearance of the pancreas. No splenomegaly. No  adrenal nodules. No radiodense kidney stones or hydronephrosis in  either kidney.    No abnormally dilated bowel loops. Diffuse chronic wall thickening,  could be due to underlying colitis. No significant free fluid in the  abdomen and pelvis. No free peritoneal or portal venous gas.    Bones and soft tissues: No suspicious osseous lesion.      Impression    IMPRESSION:    1. Although the colon is not distended, there is diffuse colonic wall  thickening, which can be seen with colitis.  2. Multiple bibasilar pulmonary nodules, of indeterminate etiology,  could be infectious or less likely neoplastic given the lack of  patient's history of malignancy. Recommend follow-up exam in 3 months  to ensure resolution.  3. Atrophic appearance of the pancreas of indeterminate etiology.    ISABEL GREEN MD     Medications       amylase-lipase-protease  1 capsule Oral TID w/meals     pantoprazole (PROTONIX) IV  40 mg Intravenous BID     polyethylene glycol  4,000 mL Oral Once     sodium chloride (PF)  3 mL Intracatheter Q8H

## 2020-12-09 NOTE — PLAN OF CARE
Pt arrive from ED @ 2130. VSS on RA, ex tachy in the low 100s at times. Denies pain, nausea, dyspnea, dizziness. Pale, weak. 1unit PRBC finished when he arrived, writer began 2nd unit PRBC and albumin post. Loose/watery mucous stools x6, some red-streaked. Up SBA to bathroom. Tele: NSR. NPO since midnight. Enteric precautions initiated while stool sample pending. Plan for GI consult.

## 2020-12-09 NOTE — PROGRESS NOTES
RECEIVING UNIT ED HANDOFF REVIEW    ED Nurse Handoff Report was reviewed by: Kyara Madrigal RN on December 8, 2020 at 8:43 PM

## 2020-12-09 NOTE — PROVIDER NOTIFICATION
MD Notification    Notified Person: MD    Notified Person Name: Dr. Quigley    Notification Date/Time:12/9/2020 at 0822    Notification Interaction: text paged    Purpose of Notification: FYI: Hemoglobin 4.5    Orders Received:    Comments:

## 2020-12-09 NOTE — PLAN OF CARE
VSS on RA. Occasional abd pain. Dizziness improved after 3rd unit blood.  Pale, weak. 1unit PRBC finished on days, await units from the U now. Hgb recheck 7.4. Loose/watery mucous stools x6 w/red/mahogony mix. Up SBA to bathroom, refuses help at times. Tele: SD. Cl liq diet and miralax/gatorade prep . Colonoscopy 12/10 at 1200. Enteric precautions initiated while stool sample pending.  GI consult. Covid negative.

## 2020-12-09 NOTE — H&P
[facility]    History and Physical  Hospitalist       Date of Admission:  12/8/2020    Assessment & Plan   Chris Siddiqi is a 30 year old male with hx of pancreatic insufficiency who presents at request of [facility] when outpatient labs found hemoglobin of 3. Within the ED, patient tachycardic -130 but with stable BP.  FOBT positive for blood however patient without active bleeding. He has chronic hx of non bloody diarrhea for the last several years since his pancreatic insufficiency dx. His diarrhea recently worsened and prompted his clinic visit with [facility]. He believes his last lab work up was at least 3 years ago.    Profound anemia  Colitis  -likely slow chronic blood loss anemia however will order LDH and haptoglobin to r/o lysis  -typed for 2 units of blood in the ED however patient has positive antibody and will likely take a while, vitals stable  -CT imaging with diffuse colonic wall thickening suggestive of colitis  -unknown etiology, no family hx of chrons or UC, patient himself denies blood or mucus in his diarrhea, no rashes or psoriasis  -[facility] consult for the AM, order enteric panel  -will attempt to ADD-ON as many labs as possible to avoid excessive blood draws with such a low Hb    Pancreatitis insufficency  -[facility] may have more information but patient reports episode of pancreatitis a few years ago (he was told it was pancreatitis) with resulting pancreatitic insufficiency  -he denies any known etiology for his pancreatitis, ? CF  -atrophic pancreatitis on imaging  -continue PTA Creon    Hypoalbuminemia  -Albumin of 1.8  -possibly related to malnutrition and/or chronic diarrhea  -with give albumin infusion for volume repletion  -consult nutrition    Pulmonary nodules  -unclear etiology  -will need 3 month follow up    DVT Prophylaxis: Low Risk/Ambulatory with no VTE prophylaxis indicated  Code Status: Full Code  Expected discharge: >2 days pending further clinical work  up.    Radha Santiago DO    Primary Care Physician   Physician No Ref-Primary    Chief Complaint   anemia    History is obtained from the patient    History of Present Illness   Chris Siddiqi is a 30 year old male who presents with a hemoglobin 3. He visited with [facility] today over a video visit for diarrhea that has not resolved over the last 2 weeks. He had labs obtained which found his abnormal Hb.     His hx is complicated and patient is not completely forthcoming with questions. He tells me a few years he was told he had an episode of acute pancreatitis however he never went to a hospital for his event. He followed up with GI and was told he had pancreatitic insufficieny and he has been taking Creon ever since. He reports since this incident he has suffered from on and off diarrhea. His typical stool is loose and brown 3-4xdaily, sometimes oily. He will have episodes of severe diarrhea >10 daily that self resolve after a few days. This specific episode started out similar to past increased diarrhea episodes however it did not get better after a few days. He reports about 2 weeks of 10x daily diarrhea. Not large volume, not foul smelling. Just specks of food and some liquid. No BRB and never black. He endorses abdominal pain with cramps but that is chronic. He denies recent illness with fever. He tells me over the last few weeks to months he has become more tired and fatigued but he was not aware that he looked very pale. He endorses dizziness and feeling lightheaded. He can walk about 50 feet before becoming SOA. He works as a  and does not leave his house much. No known COVID exposure.    For his pancreatic hx he reports some type of scope at [facility] and then infrequent visits with [facility]. Last blood work was probably 3-4 years ago.    Past Medical History    I have reviewed this patient's medical history and updated it with pertinent information if needed.   No past medical history on  file.    Past Surgical History   I have reviewed this patient's surgical history and updated it with pertinent information if needed.  No past surgical history on file.    Prior to Admission Medications   Prior to Admission Medications   Prescriptions Last Dose Informant Patient Reported? Taking?   amylase-lipase-protease (CREON 6) 6000 units CPEP 12/8/2020 at Unknown time  Yes Yes   Sig: Take 1 capsule by mouth 3 times daily (with meals)      Facility-Administered Medications: None     Allergies   No Known Allergies    Social History   I have reviewed this patient's social history and updated it with pertinent information if needed. Chris Siddiqi  reports that he has never smoked. He has never used smokeless tobacco. He reports current alcohol use. He reports that he does not use drugs.    Family History   I have reviewed this patient's family history and updated it with pertinent information if needed.   No family history on file.    Review of Systems   The 10 point Review of Systems is negative other than noted in the HPI or here.     Physical Exam   Temp: 98.3  F (36.8  C) Temp src: Oral BP: 114/59 Pulse: 107   Resp: 18 SpO2: 100 % O2 Device: None (Room air)    Vital Signs with Ranges  Temp:  [98.3  F (36.8  C)] 98.3  F (36.8  C)  Pulse:  [107-140] 107  Resp:  [9-18] 18  BP: (107-124)/(40-65) 114/59  SpO2:  [96 %-100 %] 100 %  0 lbs 0 oz    Constitutional: Awake, alert, cooperative, very pale, thin and weak  Eyes: Conjunctiva and pupils examined and normal.  HEENT: dry, mucous membrane  Respiratory: Clear to auscultation bilaterally, no crackles or wheezing.  Cardiovascular: tachycardic regular rhythm, normal S1 and S2, and no murmur noted.  GI: Soft, non-distended, non-tender, normal bowel sounds.  Lymph/Hematologic: No anterior cervical or supraclavicular adenopathy.  Skin: No rashes, no cyanosis, no edema.  Musculoskeletal: No joint swelling, erythema or tenderness.  Neurologic: Cranial nerves 2-12  intact, normal strength and sensation.  Psychiatric: Alert, oriented to person, place and time, flat affect    Data   Data reviewed today:  I personally reviewed CT imaging with colonic thickening.  Recent Labs   Lab 12/08/20  1640   WBC 12.8*   HGB 3.1*   MCV 67*   *   INR 1.10      POTASSIUM 3.9   CHLORIDE 102   CO2 26   BUN 17   CR 1.05   ANIONGAP 6   KIARA 8.4*   *   ALBUMIN 1.8*   PROTTOTAL 6.9   BILITOTAL 0.1*   ALKPHOS 106   ALT 16   AST 12   LIPASE 29*       Recent Results (from the past 24 hour(s))   CT Abdomen Pelvis w Contrast    Narrative    CT ABDOMEN PELVIS WITH CONTRAST   12/8/2020 5:50 PM     HISTORY: Abdominal pain, acute, generalized.    TECHNIQUE:  CT abdomen and pelvis with 66mL Isovue-370 IV. Radiation  dose for this scan was reduced using automated exposure control,  adjustment of the mA and/or kV according to patient size, or iterative  reconstruction technique.    COMPARISON: None available    FINDINGS:  Lower chest: Few bibasilar nodular pulmonary opacities, could be  infectious including 8 mm left lower lobe subpleural nodule (series 4  image 22) and 1.1 cm right middle lobe subpleural nodule (series 4  image 29).    Abdomen/pelvis: No suspicious focal hepatic lesion. The gallbladder is  unremarkable. Atrophic appearance of the pancreas. No splenomegaly. No  adrenal nodules. No radiodense kidney stones or hydronephrosis in  either kidney.    No abnormally dilated bowel loops. Diffuse chronic wall thickening,  could be due to underlying colitis. No significant free fluid in the  abdomen and pelvis. No free peritoneal or portal venous gas.    Bones and soft tissues: No suspicious osseous lesion.      Impression    IMPRESSION:    1. Although the colon is not distended, there is diffuse colonic wall  thickening, which can be seen with colitis.  2. Multiple bibasilar pulmonary nodules, of indeterminate etiology,  could be infectious or less likely neoplastic given the lack  of  patient's history of malignancy. Recommend follow-up exam in 3 months  to ensure resolution.  3. Atrophic appearance of the pancreas of indeterminate etiology.    ISABEL GREEN MD

## 2020-12-09 NOTE — CONSULTS
"CLINICAL NUTRITION SERVICES  -  ASSESSMENT NOTE      Future/Additional Recommendations:     Recommend daily multivitamin - will order in EPIC    May need to consider alternate means of nutrition if unable to tolerate po and have decrease in stooling (? how much he is absorbing with multiple stools per day)       Malnutrition:   % Weight Loss:  > 7.5% in 3 months (severe malnutrition)  % Intake:  </= 75% for >/= 1 month (severe malnutrition) - intake has fluctuated past few months  Subcutaneous Fat Loss:  None observed  Muscle Loss:  Clavicle bone region - moderate depletion, Patellar region - moderate depletion, Anterior thigh region - moderate depletion and Posterior calf region - moderate depletion  Fluid Retention:  None noted    Malnutrition Diagnosis: Severe malnutrition  In Context of:  Acute illness or injury         REASON FOR ASSESSMENT  Chris Siddiqi is a 30 year old male assessed by Registered Dietitian for Provider Order - \"severe malnutrition\"      NUTRITION HISTORY  Chart reviewed  Briefly visited with pt this morning - pt needing to have BM (on bowel prep)  Pt states that he follows a low fat diet, no alcohol  Also avoids dairy - \"think I have an intolerance\"  Notes that his appetite has been up/down      CURRENT NUTRITION ORDERS  Diet Order:     NPO    Plan for EGD/colonoscpy today      NUTRITION FOCUSED PHYSICAL ASSESSMENT FOR DIAGNOSING MALNUTRITION)  Yes             Observed:    Muscle wasting (refer to documentation in Malnutrition section)    Obtained from Chart/Interdisciplinary Team:  In the past several months, he has noted frequent, watery diarrhea up to 20 episodes per day.  Pancreatic insufficiency - on Creon  12/8: CT abdomen/pelvis did not demonstrate any acute bleeding but did show colitis which is likely the source given his positive hemoccult       ANTHROPOMETRICS  Height: 5'8\"  Weight: (12/9) 51.3 kg / 113 lb  BMI: 17.1  Weight Status:  Underweight BMI <18.5  IBW: 70 kg  % IBW: " "73%  Weight History: Pt states that his usual wt is ~135#.  \"My pants don't fit - I am way too thin.\"  Wt is down ~20# (15%) past few months.  Wt Readings from Last 10 Encounters:   12/09/20 51.3 kg (113 lb)   10/31/16 60.3 kg (133 lb)   05/18/16 60.3 kg (133 lb)   04/26/16 64.4 kg (142 lb)   04/23/16 63 kg (139 lb)         LABS  Labs reviewed    MEDICATIONS  Medications reviewed      ASSESSED NUTRITION NEEDS PER APPROVED PRACTICE GUIDELINES:    Dosing Weight (12/9) 51.3 kg  Estimated Energy Needs: 6560-2307 kcals (35-40 Kcal/Kg)  Justification: repletion and underweight  Estimated Protein Needs:  grams protein (1.5-2 g pro/Kg)  Justification: Repletion  Estimated Fluid Needs: 0917-4894  mL (1 mL/Kcal)  Justification: maintenance    MALNUTRITION:  % Weight Loss:  > 7.5% in 3 months (severe malnutrition)  % Intake:  </= 75% for >/= 1 month (severe malnutrition) - intake has fluctuated past few months  Subcutaneous Fat Loss:  None observed  Muscle Loss:  Clavicle bone region - moderate depletion, Patellar region - moderate depletion, Anterior thigh region - moderate depletion and Posterior calf region - moderate depletion  Fluid Retention:  None noted    Malnutrition Diagnosis: Severe malnutrition  In Context of:  Acute illness or injury    NUTRITION DIAGNOSIS:  Unintended weight loss related to fluctuating po intake and increased diarrhea  as evidenced by unintentional wt loss of 15% past few months.      NUTRITION INTERVENTIONS  Recommendations / Nutrition Prescription  NPO (diet per MD)    Recommend multivitamin - will order    May need to consider alternate means of nutrition if unable to tolerate po and have decrease in stooling (? how much he is absorbing with multiple stools per day)  .      Implementation  Nutrition education ---> reviewed current diet order  .      Nutrition Goals  Pt to receive nutrition in the next 48-72 hrs  .      MONITORING AND EVALUATION:  Progress towards goals will be monitored " and evaluated per protocol and Practice Guidelines

## 2020-12-09 NOTE — ANESTHESIA PREPROCEDURE EVALUATION
Anesthesia Pre-Procedure Evaluation    Patient: Chris Siddiqi   MRN: [id number] : 1990          Preoperative Diagnosis: Anemia [D64.9]    Procedure(s):  COLONOSCOPY    No past medical history on file.  No past surgical history on file.    Anesthesia Evaluation     . Pt has not had prior anesthetic            ROS/MED HX    ENT/Pulmonary:      (-) tobacco use, asthma and COPD   Neurologic:  - neg neurologic ROS     Cardiovascular:  - neg cardiovascular ROS       METS/Exercise Tolerance:     Hematologic: Comments: Severe anemia.     (+) Anemia, -      Musculoskeletal:         GI/Hepatic: Comment: Pancreatic insufficiency.   Chronic diarreah.        Renal/Genitourinary:  - ROS Renal section negative       Endo:  - neg endo ROS       Psychiatric:        (-) psychiatric history   Infectious Disease:         Malignancy:         Other:                                 Lab Results   Component Value Date    WBC 8.3 2020    HGB 4.5 (LL) 2020    HCT 13.9 (L) 2020     2020    CRP 12.0 (H) 2016    SED 8 2016     2020    POTASSIUM 4.1 2020    CHLORIDE 104 2020    CO2 26 2020    BUN 11 2020    CR 0.93 2020    GLC 91 2020    KIARA 7.8 (L) 2020    ALBUMIN 1.8 (L) 2020    PROTTOTAL 5.6 (L) 2020    ALT 12 2020    AST 13 2020    ALKPHOS 81 2020    BILITOTAL 0.4 2020    LIPASE 29 (L) 2020    AMYLASE 73 2016    PTT 34 2020    INR 1.10 2020       Preop Vitals  BP Readings from Last 3 Encounters:   20 103/59   10/31/16 120/60   16 130/70    Pulse Readings from Last 3 Encounters:   20 87   10/31/16 60   16 78      Resp Readings from Last 3 Encounters:   20 16   10/31/16 16   16 14    SpO2 Readings from Last 3 Encounters:   20 100%   10/31/16 99%   16 96%      Temp Readings from Last 1 Encounters:   20 36.6  C (97.9  F)  "(Oral)    Ht Readings from Last 1 Encounters:   12/09/20 1.727 m (5' 8\")      Wt Readings from Last 1 Encounters:   12/09/20 51.3 kg (113 lb)    Estimated body mass index is 17.18 kg/m  as calculated from the following:    Height as of this encounter: 1.727 m (5' 8\").    Weight as of this encounter: 51.3 kg (113 lb).       Anesthesia Plan      History & Physical Review  History and physical reviewed and following examination; no interval change.    ASA Status:  3 .    NPO Status:  > 8 hours    Plan for MAC Reason for MAC:  Deep or markedly invasive procedure (G8)  PONV prophylaxis:  Ondansetron (or other 5HT-3)  Patient transfused yesterday but still low today. Transfusion this AM then will get rechecked.         Postoperative Care  Postoperative pain management:  Multi-modal analgesia.      Consents  Anesthetic plan, risks, benefits and alternatives discussed with:  Patient.  Use of blood products discussed: No .   .                 Wes Veronica MD  "

## 2020-12-09 NOTE — PROGRESS NOTES
GI: Consult noted for severe anemia, possible colitis.  Will see pt later this am. Will plan EGD/Colonoscopy for later today if pt able to prep in time. Hgb will need to be at least 7 in order to get sedation.    Lynne Owens MD  [facility]  Phone [phone] until 5 pm  Office [phone] for after hours on call provider

## 2020-12-09 NOTE — CONSULTS
[facility]  Gastroenterology Consultation    Chris Siddiqi  [address]   [facility] [address]  30 year old male    Admission Date/Time: 12/8/2020  Primary Care Provider: No Ref-Primary, Physician    We were asked to see the patient in consultation by Dr. Santiago for evaluation of colitis.        HPI:  Chris Siddiqi is a 30 year old male with a history of chronic pancreatitis and pancreatic insufficiency who has been followed by Ange Lara at [facility] for diarrhea, nausea, and vomiting.  The patient has had several months of loose stool, nausea, and vomiting.  COVID-19 infection was suspected, however, testing for this was negative.  Stool studies were negative.       As mentioned above, the patient has a history of chronic pancreatitis and pancreatic insufficiency and underwent an EUS with biopsy 11/2016 which revealed parenchymal changes of the pancreas consistent with chronic pancreatitis.  He was started on Creon and a low fat diet.  He also has a history of C. Difficile diarrhea in 2016.  Celiac and thyroid testing have been negative in the past.    In the past several months, he has noted frequent, watery diarrhea up to 20 episodes per day.  He has had intermittent episodes of bright red blood in the toilet bowl.  The patient has never had an upper endoscopy or colonoscopy.  Basic blood work and a colonoscopy was recommended at his last office visit ion 12/3.    The patient's hemoglobin was found to be 3.1.  He was advised to present to [facility] for further evaluation.  WBC 12.8.  Platelets 622.  COVID-19 negative.    CT A/P with contrast showed diffuse colonic wall thickening, multiple bibasilar pulmonary nodules of indeterminate etiology, atrophic appearance of the pancreas.    ROS: A comprehensive ten point review of systems was negative aside from those in mentioned in the HPI.      MEDICATIONS: No current facility-administered medications on file prior to  encounter.        amylase-lipase-protease (CREON 6) 6000 units CPEP, Take 1 capsule by mouth 3 times daily (with meals)        ALLERGIES: No Known Allergies    No past medical history on file.    No past surgical history on file.      SOCIAL HISTORY:  Social History     Tobacco Use     Smoking status: Never Smoker     Smokeless tobacco: Never Used   Substance Use Topics     Alcohol use: Yes     Alcohol/week: 0.0 standard drinks     Drug use: No       FAMILY HISTORY:  No family history on file.    PHYSICAL EXAM:   /65   Pulse 87   Temp 99  F (37.2  C) (Oral)   Resp 16   SpO2 97%     Constitutional: NAD, comfortable  Cardiovascular: RRR, normal S1 and S2, no r/c/g/m  Respiratory: CTAB  Psychiatric: mentation appears normal and affect normal  Head: Normocephalic. Atraumatic.    Neck: Neck supple. No adenopathy. Thyroid symmetric, normal size, trachea midline  Eyes:  PERRL, no icterus  ENT: Hearing adequate, pharynx normal without erythema or exudate  Abdomen:   Auscultation: + BS  Appearance: non-distended  Palpation: non-tender  NEURO: grossly negative  SKIN: no suspicious lesions or rashes  LYMPH:   anterior cervical: no adenopathy  posterior cervical: no adenopathy  supraclavicular: no adenopathy          ADDITIONAL COMMENTS:   I reviewed the patient's new clinical lab test results.   Recent Labs   Lab Test 12/08/20  1640 10/31/16  1623 05/18/16  1658   WBC 12.8*  --  10.5   HGB 3.1* 15.5 16.0   MCV 67*  --  86   *  --  207   INR 1.10  --   --      Recent Labs   Lab Test 12/08/20  1640 10/31/16  1623 05/18/16  1658     --  139   POTASSIUM 3.9  --  3.8   CHLORIDE 102  --  102   CO2 26  --  27   BUN 17  --  19   CR 1.05 1.08 1.40*   ANIONGAP 6  --  9.9   KIARA 8.4*  --  9.2   *  --  97     Recent Labs   Lab Test 12/08/20  1640 05/24/16  0815 05/18/16  1658   ALBUMIN 1.8*  --  4.3   BILITOTAL 0.1*  --  0.8   DBIL <0.1  --   --    ALT 16  --  21   AST 12  --  17   ALKPHOS 106  --  110  "  LIPASE 29* 956* 1,816*   AMYLASE  --   --  73             .    CONSULTATION ASSESSMENT AND PLAN:      29 yo male with history of chronic pancreatitis and pancreatic insufficiency who presents with hemoglobin of 3.1.  Patient has had recent diarrhea, nausea, vomiting.  Blood work done at [facility] reveals hemoglobin of 3.1, elevated WBC, elevated platelets.  CT A/P showed diffuse colitis.  Stool studies negative.  Patient has had BRBPR.  Differential diagnosis includes colitis of infectious, inflammatory, less likely ischemic etiology.    -  Monitor H/H; transfuse to at least 7.  -  EGD/colonoscopy later today if hemoglobin acceptable.        I discussed the patient's findings and plan with Dr. Owens.          Anai Vines, PAC  [facility]  Office:  [phone] call if needed after 1PM  Cell:  [phone], not available after 1PM at this number    Staff addendum: 30 yom with chronic pancreatitis/pancreatic insufficiency admitted with severe anemia. Reports chronic bloody stool and diarrhea x 2 months. Has lower abd pain and 20 lb weight loss. Some lightheadedness. Hgb 3.1 yesterday, 4.5 today.    /68 (BP Location: Left arm)   Pulse 76   Temp 97.6  F (36.4  C) (Oral)   Resp 16   Ht 1.727 m (5' 8\")   Wt 51.3 kg (113 lb)   SpO2 100%   BMI 17.18 kg/m    Gen: awake alert NAD, pale  Abd: S ND mild lower abd tenderness    A/P: 30 yom with weight loss abd pain, diarrhea, bloody stools. Hx of chronic pancreatitis. Currently is significantly underdosed on Creon which may contribute to diarrhea and weight loss but would not cause GI bleed. Infection unlikely given chronic nature and given age unlikely to be ischemic. History concerning for IBD. CT suggestive of colitis. Pt not able to complete prep for colonoscopy today  -Increase Creon to 90K units with meals  -Transfuse to keep hgb over 7  -Prep for EGD/colon tomorrow  -Clears today ok  -Consider IV iron infusion  -Check B12, folate, retic count, " MORENO Owens MD  [facility]  Phone [phone] until 5 pm  Office [phone] for after hours on call provider

## 2020-12-09 NOTE — PHARMACY-ADMISSION MEDICATION HISTORY
Pharmacy Medication History  Admission medication history interview status for the 12/8/2020  admission is complete. See EPIC admission navigator for prior to admission medications       Medication history sources: Patient  Location of interview: phone  Adherence Assessment: Good    Significant changes made to the medication list:  Added Creon      Additional medication history information:   none    Medication reconciliation completed by provider prior to medication history? No    Time spent in this activity: 10min      Prior to Admission medications    Medication Sig Last Dose Taking? Auth Provider   amylase-lipase-protease (CREON 6) 6000 units CPEP Take 1 capsule by mouth 3 times daily (with meals) 12/8/2020 at Unknown time Yes Unknown, Entered By History       The information provided in this note is only as accurate as the sources available at the time of the update(s).

## 2020-12-09 NOTE — ED NOTES
[facility]  ED Nurse Handoff Report    ED Chief complaint: Abnormal Labs      ED Diagnosis:   Final diagnoses:   Gastrointestinal hemorrhage, unspecified gastrointestinal hemorrhage type   Anemia due to blood loss, acute       Code Status: Full Code    Allergies: No Known Allergies    Patient Story: Present from GI clinic with hgb 3.1. Has been having 20 episodes diarrhea for past 8 weeks. Has under functioning pancreas.    Focused Assessment:  A&Ox4, tachycardiac rate 112, pale skin, 1 blood stool in ER.     Treatments and/or interventions provided: 1 liter NS, ct scan abd    Patient's response to treatments and/or interventions: stable     To be done/followed up on inpatient unit:  blood transfusion    Does this patient have any cognitive concerns?: none    Activity level - Baseline/Home:  Independent  Activity Level - Current:   Independent    Patient's Preferred language: English   Needed?: No    Isolation: None  Infection: Not Applicable  Patient tested for COVID 19 prior to admission: YES  Bariatric?: No    Vital Signs:   Vitals:    12/08/20 1800 12/08/20 1815 12/08/20 1830 12/08/20 1845   BP: 118/65 109/62  114/59   Pulse: 109 110 116 107   Resp: 12 12 12 18   Temp:       TempSrc:       SpO2: 100% 100% 100% 100%     Labs Ordered and Resulted from Time of ED Arrival Up to the Time of Departure from the ED   CBC WITH PLATELETS DIFFERENTIAL - Abnormal; Notable for the following components:       Result Value    WBC 12.8 (*)     RBC Count 1.63 (*)     Hemoglobin 3.1 (*)     Hematocrit 10.9 (*)     MCV 67 (*)     MCH 19.0 (*)     MCHC 28.4 (*)     RDW 20.5 (*)     Platelet Count 622 (*)     Nucleated RBCs 2 (*)     Absolute Neutrophil 9.6 (*)     Absolute Monocytes 1.4 (*)     All other components within normal limits   BASIC METABOLIC PANEL - Abnormal; Notable for the following components:    Glucose 118 (*)     Calcium 8.4 (*)     All other components within normal limits   HEPATIC  PANEL - Abnormal; Notable for the following components:    Bilirubin Total 0.1 (*)     Albumin 1.8 (*)     All other components within normal limits   LIPASE - Abnormal; Notable for the following components:    Lipase 29 (*)     All other components within normal limits   OCCULT BLOOD STOOL - Abnormal; Notable for the following components:    Occult Blood Positive (*)     All other components within normal limits   BLOOD GAS VENOUS - Abnormal; Notable for the following components:    PCO2 Venous 34 (*)     PO2 Venous 17 (*)     All other components within normal limits   ABO/RH TYPE AND SCREEN - Abnormal; Notable for the following components:    Antibody Screen Pos (*)     All other components within normal limits   INR   PARTIAL THROMBOPLASTIN TIME   COVID-19 VIRUS (CORONAVIRUS) BY PCR   RED BLOOD CELL PREPARE ORDER UNIT   BLOOD COMPONENT   BLOOD COMPONENT       Cardiac Rhythm:Cardiac Rhythm: Sinus tachycardia    Was the PSS-3 completed:   Yes  What interventions are required if any?               Family Comments: none  OBS brochure/video discussed/provided to patient/family: No              Name of person given brochure if not patient: n/a              Relationship to patient: n/a    For the majority of the shift this patient's behavior was Green.   Behavioral interventions performed were n/a.    ED NURSE PHONE NUMBER: *[phone]

## 2020-12-10 ENCOUNTER — ANESTHESIA EVENT (OUTPATIENT)
Dept: GASTROENTEROLOGY | Facility: CLINIC | Age: 30
DRG: 385 | End: 2020-12-10
Payer: COMMERCIAL

## 2020-12-10 ENCOUNTER — ANESTHESIA (OUTPATIENT)
Dept: GASTROENTEROLOGY | Facility: CLINIC | Age: 30
DRG: 385 | End: 2020-12-10
Payer: COMMERCIAL

## 2020-12-10 LAB
BLD PROD TYP BPU: NORMAL
BLD UNIT ID BPU: 0
BLOOD PRODUCT CODE: NORMAL
BPU ID: NORMAL
C DIFF TOX B STL QL: NEGATIVE
C DIFF TOX B STL QL: NORMAL
COLONOSCOPY: NORMAL
HAPTOGLOB SERPL-MCNC: 313 MG/DL (ref 32–197)
HGB BLD-MCNC: 6.7 G/DL (ref 13.3–17.7)
HGB BLD-MCNC: 9.7 G/DL (ref 13.3–17.7)
HGB BLD-MCNC: 9.8 G/DL (ref 13.3–17.7)
HGB BLD-MCNC: 9.8 G/DL (ref 13.3–17.7)
RETICS # AUTO: 70.7 10E9/L (ref 25–95)
RETICS/RBC NFR AUTO: 1.9 % (ref 0.5–2)
SPECIMEN SOURCE: NORMAL
SPECIMEN SOURCE: NORMAL
TRANSFUSION STATUS PATIENT QL: NORMAL
TRANSFUSION STATUS PATIENT QL: NORMAL
UPPER GI ENDOSCOPY: NORMAL

## 2020-12-10 PROCEDURE — 0DBM8ZX EXCISION OF DESCENDING COLON, VIA NATURAL OR ARTIFICIAL OPENING ENDOSCOPIC, DIAGNOSTIC: ICD-10-PCS | Performed by: INTERNAL MEDICINE

## 2020-12-10 PROCEDURE — 45380 COLONOSCOPY AND BIOPSY: CPT | Performed by: INTERNAL MEDICINE

## 2020-12-10 PROCEDURE — 250N000011 HC RX IP 250 OP 636: Performed by: HOSPITALIST

## 2020-12-10 PROCEDURE — C9113 INJ PANTOPRAZOLE SODIUM, VIA: HCPCS | Performed by: HOSPITALIST

## 2020-12-10 PROCEDURE — 999N000010 HC STATISTIC ANES STAT CODE-CRNA PER MINUTE: Performed by: INTERNAL MEDICINE

## 2020-12-10 PROCEDURE — 250N000009 HC RX 250: Performed by: NURSE ANESTHETIST, CERTIFIED REGISTERED

## 2020-12-10 PROCEDURE — 85018 HEMOGLOBIN: CPT | Performed by: STUDENT IN AN ORGANIZED HEALTH CARE EDUCATION/TRAINING PROGRAM

## 2020-12-10 PROCEDURE — 250N000013 HC RX MED GY IP 250 OP 250 PS 637: Performed by: INTERNAL MEDICINE

## 2020-12-10 PROCEDURE — 88305 TISSUE EXAM BY PATHOLOGIST: CPT | Mod: 26 | Performed by: PATHOLOGY

## 2020-12-10 PROCEDURE — 258N000003 HC RX IP 258 OP 636: Performed by: NURSE ANESTHETIST, CERTIFIED REGISTERED

## 2020-12-10 PROCEDURE — 36415 COLL VENOUS BLD VENIPUNCTURE: CPT | Performed by: HOSPITALIST

## 2020-12-10 PROCEDURE — 87493 C DIFF AMPLIFIED PROBE: CPT | Performed by: INTERNAL MEDICINE

## 2020-12-10 PROCEDURE — 88305 TISSUE EXAM BY PATHOLOGIST: CPT | Mod: TC | Performed by: INTERNAL MEDICINE

## 2020-12-10 PROCEDURE — 250N000011 HC RX IP 250 OP 636: Performed by: INTERNAL MEDICINE

## 2020-12-10 PROCEDURE — 88342 IMHCHEM/IMCYTCHM 1ST ANTB: CPT | Mod: 26 | Performed by: PATHOLOGY

## 2020-12-10 PROCEDURE — 85045 AUTOMATED RETICULOCYTE COUNT: CPT | Performed by: HOSPITALIST

## 2020-12-10 PROCEDURE — 88342 IMHCHEM/IMCYTCHM 1ST ANTB: CPT | Mod: TC | Performed by: INTERNAL MEDICINE

## 2020-12-10 PROCEDURE — 258N000003 HC RX IP 258 OP 636: Performed by: INTERNAL MEDICINE

## 2020-12-10 PROCEDURE — P9016 RBC LEUKOCYTES REDUCED: HCPCS | Performed by: EMERGENCY MEDICINE

## 2020-12-10 PROCEDURE — 250N000011 HC RX IP 250 OP 636: Performed by: NURSE ANESTHETIST, CERTIFIED REGISTERED

## 2020-12-10 PROCEDURE — 99233 SBSQ HOSP IP/OBS HIGH 50: CPT | Performed by: INTERNAL MEDICINE

## 2020-12-10 PROCEDURE — 36415 COLL VENOUS BLD VENIPUNCTURE: CPT | Performed by: STUDENT IN AN ORGANIZED HEALTH CARE EDUCATION/TRAINING PROGRAM

## 2020-12-10 PROCEDURE — 43239 EGD BIOPSY SINGLE/MULTIPLE: CPT | Performed by: INTERNAL MEDICINE

## 2020-12-10 PROCEDURE — 0DBK8ZX EXCISION OF ASCENDING COLON, VIA NATURAL OR ARTIFICIAL OPENING ENDOSCOPIC, DIAGNOSTIC: ICD-10-PCS | Performed by: INTERNAL MEDICINE

## 2020-12-10 PROCEDURE — 0DB68ZX EXCISION OF STOMACH, VIA NATURAL OR ARTIFICIAL OPENING ENDOSCOPIC, DIAGNOSTIC: ICD-10-PCS | Performed by: INTERNAL MEDICINE

## 2020-12-10 PROCEDURE — 370N000002 HC ANESTHESIA TECHNICAL FEE, EACH ADDTL 15 MIN: Performed by: INTERNAL MEDICINE

## 2020-12-10 PROCEDURE — 85018 HEMOGLOBIN: CPT | Performed by: HOSPITALIST

## 2020-12-10 PROCEDURE — 120N000001 HC R&B MED SURG/OB

## 2020-12-10 PROCEDURE — 0DB98ZX EXCISION OF DUODENUM, VIA NATURAL OR ARTIFICIAL OPENING ENDOSCOPIC, DIAGNOSTIC: ICD-10-PCS | Performed by: INTERNAL MEDICINE

## 2020-12-10 PROCEDURE — 370N000001 HC ANESTHESIA TECHNICAL FEE, 1ST 30 MIN: Performed by: INTERNAL MEDICINE

## 2020-12-10 RX ORDER — ONDANSETRON 2 MG/ML
INJECTION INTRAMUSCULAR; INTRAVENOUS PRN
Status: DISCONTINUED | OUTPATIENT
Start: 2020-12-10 | End: 2020-12-10

## 2020-12-10 RX ORDER — NALOXONE HYDROCHLORIDE 0.4 MG/ML
0.2 INJECTION, SOLUTION INTRAMUSCULAR; INTRAVENOUS; SUBCUTANEOUS
Status: DISCONTINUED | OUTPATIENT
Start: 2020-12-10 | End: 2020-12-10

## 2020-12-10 RX ORDER — LIDOCAINE HYDROCHLORIDE 20 MG/ML
INJECTION, SOLUTION INFILTRATION; PERINEURAL PRN
Status: DISCONTINUED | OUTPATIENT
Start: 2020-12-10 | End: 2020-12-10

## 2020-12-10 RX ORDER — METHYLPREDNISOLONE SODIUM SUCCINATE 40 MG/ML
30 INJECTION, POWDER, LYOPHILIZED, FOR SOLUTION INTRAMUSCULAR; INTRAVENOUS EVERY 12 HOURS
Status: DISCONTINUED | OUTPATIENT
Start: 2020-12-10 | End: 2020-12-16

## 2020-12-10 RX ORDER — PANTOPRAZOLE SODIUM 40 MG/1
40 TABLET, DELAYED RELEASE ORAL
Status: DISCONTINUED | OUTPATIENT
Start: 2020-12-11 | End: 2020-12-18 | Stop reason: HOSPADM

## 2020-12-10 RX ORDER — SODIUM CHLORIDE, SODIUM LACTATE, POTASSIUM CHLORIDE, CALCIUM CHLORIDE 600; 310; 30; 20 MG/100ML; MG/100ML; MG/100ML; MG/100ML
INJECTION, SOLUTION INTRAVENOUS CONTINUOUS PRN
Status: DISCONTINUED | OUTPATIENT
Start: 2020-12-10 | End: 2020-12-10

## 2020-12-10 RX ORDER — PROPOFOL 10 MG/ML
INJECTION, EMULSION INTRAVENOUS CONTINUOUS PRN
Status: DISCONTINUED | OUTPATIENT
Start: 2020-12-10 | End: 2020-12-10

## 2020-12-10 RX ORDER — NALOXONE HYDROCHLORIDE 0.4 MG/ML
0.4 INJECTION, SOLUTION INTRAMUSCULAR; INTRAVENOUS; SUBCUTANEOUS
Status: DISCONTINUED | OUTPATIENT
Start: 2020-12-10 | End: 2020-12-10

## 2020-12-10 RX ORDER — PROPOFOL 10 MG/ML
INJECTION, EMULSION INTRAVENOUS PRN
Status: DISCONTINUED | OUTPATIENT
Start: 2020-12-10 | End: 2020-12-10

## 2020-12-10 RX ADMIN — ONDANSETRON 4 MG: 2 INJECTION INTRAMUSCULAR; INTRAVENOUS at 12:57

## 2020-12-10 RX ADMIN — PANTOPRAZOLE SODIUM 40 MG: 40 INJECTION, POWDER, FOR SOLUTION INTRAVENOUS at 08:59

## 2020-12-10 RX ADMIN — PANCRELIPASE 24000 UNITS: 30000; 6000; 19000 CAPSULE, DELAYED RELEASE PELLETS ORAL at 18:09

## 2020-12-10 RX ADMIN — PROPOFOL 30 MG: 10 INJECTION, EMULSION INTRAVENOUS at 12:30

## 2020-12-10 RX ADMIN — METHYLPREDNISOLONE SODIUM SUCCINATE 30 MG: 40 INJECTION, POWDER, FOR SOLUTION INTRAMUSCULAR; INTRAVENOUS at 16:32

## 2020-12-10 RX ADMIN — SODIUM CHLORIDE, POTASSIUM CHLORIDE, SODIUM LACTATE AND CALCIUM CHLORIDE: 600; 310; 30; 20 INJECTION, SOLUTION INTRAVENOUS at 12:25

## 2020-12-10 RX ADMIN — IRON SUCROSE 300 MG: 20 INJECTION, SOLUTION INTRAVENOUS at 10:02

## 2020-12-10 RX ADMIN — PROPOFOL 30 MG: 10 INJECTION, EMULSION INTRAVENOUS at 12:31

## 2020-12-10 RX ADMIN — PROPOFOL 20 MG: 10 INJECTION, EMULSION INTRAVENOUS at 12:38

## 2020-12-10 RX ADMIN — PROPOFOL 20 MG: 10 INJECTION, EMULSION INTRAVENOUS at 12:37

## 2020-12-10 RX ADMIN — LIDOCAINE HYDROCHLORIDE 60 MG: 20 INJECTION, SOLUTION INFILTRATION; PERINEURAL at 12:30

## 2020-12-10 RX ADMIN — PROPOFOL 150 MCG/KG/MIN: 10 INJECTION, EMULSION INTRAVENOUS at 12:30

## 2020-12-10 ASSESSMENT — ACTIVITIES OF DAILY LIVING (ADL)
ADLS_ACUITY_SCORE: 16

## 2020-12-10 ASSESSMENT — LIFESTYLE VARIABLES: TOBACCO_USE: 0

## 2020-12-10 NOTE — ANESTHESIA CARE TRANSFER NOTE
Patient: Chris Siddiqi    Procedure(s):  COLONOSCOPY, WITH POLYPECTOMY AND BIOPSY  Esophagoscopy, gastroscopy, duodenoscopy (EGD), combined    Diagnosis: Anemia [D64.9]  Diagnosis Additional Information: No value filed.    Anesthesia Type:   No value filed.     Note:  Airway :Nasal Cannula  Patient transferred to:Phase II  Comments: At end of procedure, spontaneous respirations, patient alert to voice, able to follow commands. Oxygen via nasal cannula at 3 liters per minute to endo recovery. Oxygen tubing connected to wall O2, SpO2, NiBP, and EKG monitors and alarms on and functioning, report on patient's clinical status given to endo RN, RN questions answered.Handoff Report: Identifed the Patient, Identified the Reponsible Provider, Reviewed the pertinent medical history, Discussed the surgical course, Reviewed Intra-OP anesthesia mangement and issues during anesthesia, Set expectations for post-procedure period and Allowed opportunity for questions and acknowledgement of understanding      Vitals: (Last set prior to Anesthesia Care Transfer)    CRNA VITALS  12/10/2020 1235 - 12/10/2020 1314      12/10/2020             Pulse:  70    SpO2:  99 %    Resp Rate (set):  10                Electronically Signed By: ORALIA Santiago CRNA  December 10, 2020  1:14 PM

## 2020-12-10 NOTE — PROGRESS NOTES
GI: EGD and colonoscopy done. EGD just showed mild reflux esophagitis. Colon showed severe pan colonic inflammation. Appearance is consistent with UC. Doubt infectious given chronic nature, neg enteric panel. C diff pending but low likelihood    Recs  -F/u path  -Start IV methylprednisolone given severity of disease  -Full liquid diet  -PPI  -CRS consult recommended if condition worsens  -Transfuse to keep Hgb 7 or higher  -Hold on DVTP given anemia and bleeding. Encourage ambulation.    Lynne Owens MD  [facility]  Phone [phone] until 5 pm  Office [phone] for after hours on call provider

## 2020-12-10 NOTE — ANESTHESIA PREPROCEDURE EVALUATION
Anesthesia Pre-Procedure Evaluation    Patient: Chris Siddiqi   MRN: [id number] : 1990          Preoperative Diagnosis: Anemia [D64.9]    Procedure(s):  COLONOSCOPY    No past medical history on file.  No past surgical history on file.    Anesthesia Evaluation     . Pt has not had prior anesthetic            ROS/MED HX    ENT/Pulmonary:      (-) tobacco use and asthma   Neurologic:  - neg neurologic ROS     Cardiovascular:  - neg cardiovascular ROS       METS/Exercise Tolerance:     Hematologic: Comments: Severe anemia.     (+) Anemia, -      Musculoskeletal:         GI/Hepatic: Comment: Pancreatic insufficiency  Chronic diarrhea        Renal/Genitourinary:  - ROS Renal section negative       Endo:  - neg endo ROS       Psychiatric:        (-) psychiatric history   Infectious Disease:         Malignancy:         Other:                                 Lab Results   Component Value Date    WBC 8.3 2020    HGB 9.7 (L) 12/10/2020    HCT 13.9 (L) 2020     2020    CRP 24.6 (H) 2020    SED 8 2016     2020    POTASSIUM 4.1 2020    CHLORIDE 104 2020    CO2 26 2020    BUN 11 2020    CR 0.93 2020    GLC 91 2020    KIARA 7.8 (L) 2020    ALBUMIN 1.8 (L) 2020    PROTTOTAL 5.6 (L) 2020    ALT 12 2020    AST 13 2020    ALKPHOS 81 2020    BILITOTAL 0.4 2020    LIPASE 29 (L) 2020    AMYLASE 73 2016    PTT 34 2020    INR 1.10 2020       Preop Vitals  BP Readings from Last 3 Encounters:   12/10/20 114/67   10/31/16 120/60   16 130/70    Pulse Readings from Last 3 Encounters:   12/10/20 73   10/31/16 60   16 78      Resp Readings from Last 3 Encounters:   12/10/20 16   10/31/16 16   16 14    SpO2 Readings from Last 3 Encounters:   12/10/20 94%   10/31/16 99%   16 96%      Temp Readings from Last 1 Encounters:   12/10/20 36.1  C (97  F)    Ht Readings  "from Last 1 Encounters:   12/09/20 1.727 m (5' 8\")      Wt Readings from Last 1 Encounters:   12/09/20 51.3 kg (113 lb)    Estimated body mass index is 17.18 kg/m  as calculated from the following:    Height as of this encounter: 1.727 m (5' 8\").    Weight as of this encounter: 51.3 kg (113 lb).       Anesthesia Plan      History & Physical Review  History and physical reviewed and following examination; no interval change.    ASA Status:  2 .    NPO Status:  > 8 hours    Plan for MAC   PONV prophylaxis:  Ondansetron (or other 5HT-3)         Postoperative Care      Consents  Anesthetic plan, risks, benefits and alternatives discussed with:  Patient..                 Hu Mcdonnell MD  "

## 2020-12-10 NOTE — PLAN OF CARE
A&Ox4. VSS ex soft BP. Tele: NSR. C/O abdominal pain but does not want interventions. Hgb recheck @ 0000 was 6.7  - one unit PRCB given overnight, recheck 0800. Bowel prep finished during evening - mag citrate @ 2300. BM getting clearer, still bloody. NPO @ MN besides mag citrate. Pt weak but wants to be ind - explained to call for assistance, does not. Enteric precautions maintained pending C. Diff test. Plan for colonoscopy 12/10 @ 1200 pending Hgb.

## 2020-12-10 NOTE — ANESTHESIA POSTPROCEDURE EVALUATION
Patient: Chris Siddiqi    Procedure(s):  COLONOSCOPY, WITH POLYPECTOMY AND BIOPSY  Esophagoscopy, gastroscopy, duodenoscopy (EGD), combined    Diagnosis:Anemia [D64.9]  Diagnosis Additional Information: No value filed.    Anesthesia Type:  No value filed.    Note:  Anesthesia Post Evaluation    Patient location during evaluation: Bedside  Patient participation: Able to fully participate in evaluation  Level of consciousness: awake and alert  Pain management: adequate  Airway patency: patent  Cardiovascular status: acceptable  Respiratory status: acceptable  Hydration status: acceptable  PONV: none             Last vitals:  Vitals:    12/10/20 0951 12/10/20 1034 12/10/20 1309   BP: 114/69 114/67 99/63   Pulse: 86 73 66   Resp: 16 16 17   Temp: 36.9  C (98.4  F) 36.1  C (97  F)    SpO2: 100% 94% 99%         Electronically Signed By: Hu Mcdonnell MD  December 10, 2020  1:19 PM

## 2020-12-10 NOTE — PLAN OF CARE
Pt with significant loose stools w/ bowel prep for colonoscopy.  Bloody.  Hgb stable, 9.7 and 9.8 no transfusions. Colonoscopy done and just back to floor. Looks likes UC. Steroids ordered, but not loaded yet. Pt pale.  Steady on feet, refuses assist. Will order full liquid does soon per pt. CDIFF pending. VSS. Drowsy after sedation from endo.

## 2020-12-10 NOTE — PROGRESS NOTES
[facility]    Medicine Progress Note - Hospitalist Service       Date of Admission:  12/8/2020  Assessment & Plan              Profound microcytic anemia, likely Fe deficiency  Acute blood loss anemia  Possible ulcerative colitis with acute flareup  Numerous bowel movements since October accompanied by 8 lbs weight loss  He did not notice bleeding  likely slow chronic blood loss anemia however will order LDH and haptoglobin to r/o lysis  CT imaging with diffuse colonic wall thickening suggestive of colitis  typed for 2 units of blood in the ED, which the nursing reports transfused without difficulty overnight  Small amount of blood striking in his stools  plan  - [facility] consulted  Patient went underwent a EGD which showed LA grade a reflux esophagitis.  Recommended Protonix daily for 1 week as per GI  Colonoscopy findings consistent with possible ulcerative colitis  Started on methylprednisolone 30 mg IV twice daily as per [facility] to help with ulcerative colitis  With blood transfusions hemoglobin improved to 9.7 today  Patient was still bleeding with colonoscopy prep earlier today  IV iron transfusion 2 doses ordered during this hospitalization  -   Pancreatitis insufficency  [facility] may have more information but patient reports episode of pancreatitis a few years ago (he was told it was pancreatitis) with resulting pancreatitic insufficiency  -he denies any known etiology for his pancreatitis, ? CF  -atrophic pancreatitis on imaging  -continue PTA Creon     Hypoalbuminemia  Albumin of 1.8  possibly related to malnutrition and/or chronic diarrhea  Plan  - albumin prn, but at this point he likely still needs blood  -consult nutrition     Pulmonary nodules  -unclear etiology  -will need 3 month follow up CT, defer to PCP     Diet: Full Liquid Diet    DVT Prophylaxis: Pneumatic Compression Devices  Alexander Catheter: not present  Code Status: Full Code           Disposition Plan   Expected  discharge: 2 - 3 days, recommended to prior living arrangement once hemoglobin stable and diarrhea and bleeding improves.  Entered: Teresa Downey MD 12/10/2020, 2:22 PM       The patient's care was discussed with the Patient.    Teresa Downey MD   Page [phone](7AM-6PM)      ______________________________________________________________________    Interval History   Patient never underwent colonoscopy preparation overnight.  Was having nonbloody watery stools were during the prep.  Feels weak and tired    Data reviewed today: I reviewed all medications, new labs and imaging results over the last 24 hours. I personally reviewed   CT with colitis and pulmonary nodules in bilateral bases.  EKG NSR    Physical Exam   Vital Signs: Temp: 97  F (36.1  C) Temp src: Oral BP: 107/76 Pulse: 62   Resp: 14 SpO2: 100 % O2 Device: None (Room air)    Weight: 113 lbs 0 oz  Constitutional: Awake, alert, cooperative, very pale, thin and weak  Eyes: Conjunctiva and pupils examined and normal.  HEENT: dry, mucous membrane  Respiratory: Clear to auscultation bilaterally, no crackles or wheezing.  Cardiovascular: tachycardic regular rhythm, normal S1 and S2, and no murmur noted.  GI: Soft, non-distended, non-tender, normal bowel sounds.  Lymph/Hematologic: No anterior cervical or supraclavicular adenopathy.  Skin: No rashes, no cyanosis, no edema.  Musculoskeletal: No joint swelling, erythema or tenderness.  Neurologic: Cranial nerves 2-12 intact, normal strength and sensation.  Psychiatric: Alert, oriented to person, place and time, flat affect       Data   Recent Labs   Lab 12/10/20  1217 12/10/20  0726 12/10/20  0146 12/09/20  0750 12/09/20  0750 12/08/20  1640   WBC  --   --   --   --  8.3 12.8*   HGB 9.8* 9.7* 6.7*   < > 4.5* 3.1*   MCV  --   --   --   --  74* 67*   PLT  --   --   --   --  399 622*   INR  --   --   --   --   --  1.10   NA  --   --   --   --  133 134   POTASSIUM  --   --   --   --  4.1 3.9   CHLORIDE  --   --   --    --  104 102   CO2  --   --   --   --  26 26   BUN  --   --   --   --  11 17   CR  --   --   --   --  0.93 1.05   ANIONGAP  --   --   --   --  3 6   KIARA  --   --   --   --  7.8* 8.4*   GLC  --   --   --   --  91 118*   ALBUMIN  --   --   --   --  1.8* 1.8*   PROTTOTAL  --   --   --   --  5.6* 6.9   BILITOTAL  --   --   --   --  0.4 0.1*   ALKPHOS  --   --   --   --  81 106   ALT  --   --   --   --  12 16   AST  --   --   --   --  13 12   LIPASE  --   --   --   --   --  29*    < > = values in this interval not displayed.     No results found for this or any previous visit (from the past 24 hour(s)).  Medications       amylase-lipase-protease  3 capsule Oral TID w/meals     iron sucrose (VENOFER) intermittent infusion  300 mg Intravenous Daily     methylPREDNISolone  30 mg Intravenous Q12H     multivitamin w/minerals  1 tablet Oral Daily     [START ON 12/11/2020] pantoprazole  40 mg Oral QAM AC     sodium chloride (PF)  3 mL Intracatheter Q8H

## 2020-12-11 LAB
ANION GAP SERPL CALCULATED.3IONS-SCNC: 8 MMOL/L (ref 3–14)
BUN SERPL-MCNC: 9 MG/DL (ref 7–30)
CALCIUM SERPL-MCNC: 8.6 MG/DL (ref 8.5–10.1)
CHLORIDE SERPL-SCNC: 102 MMOL/L (ref 94–109)
CO2 SERPL-SCNC: 26 MMOL/L (ref 20–32)
CREAT SERPL-MCNC: 0.95 MG/DL (ref 0.66–1.25)
DEPRECATED CALCIDIOL+CALCIFEROL SERPL-MC: 18 UG/L (ref 20–75)
ERYTHROCYTE [DISTWIDTH] IN BLOOD BY AUTOMATED COUNT: 19.5 % (ref 10–15)
GFR SERPL CREATININE-BSD FRML MDRD: >90 ML/MIN/{1.73_M2}
GLUCOSE SERPL-MCNC: 104 MG/DL (ref 70–99)
HBV SURFACE AB SERPL IA-ACNC: 29.36 M[IU]/ML
HBV SURFACE AG SERPL QL IA: NONREACTIVE
HCT VFR BLD AUTO: 29 % (ref 40–53)
HGB BLD-MCNC: 9.3 G/DL (ref 13.3–17.7)
INTERPRETATION ECG - MUSE: NORMAL
MCH RBC QN AUTO: 25.1 PG (ref 26.5–33)
MCHC RBC AUTO-ENTMCNC: 32.1 G/DL (ref 31.5–36.5)
MCV RBC AUTO: 78 FL (ref 78–100)
PLATELET # BLD AUTO: 454 10E9/L (ref 150–450)
POTASSIUM SERPL-SCNC: 4.4 MMOL/L (ref 3.4–5.3)
RBC # BLD AUTO: 3.7 10E12/L (ref 4.4–5.9)
SODIUM SERPL-SCNC: 136 MMOL/L (ref 133–144)
WBC # BLD AUTO: 13.8 10E9/L (ref 4–11)

## 2020-12-11 PROCEDURE — 250N000011 HC RX IP 250 OP 636: Performed by: INTERNAL MEDICINE

## 2020-12-11 PROCEDURE — 99232 SBSQ HOSP IP/OBS MODERATE 35: CPT | Performed by: INTERNAL MEDICINE

## 2020-12-11 PROCEDURE — 36415 COLL VENOUS BLD VENIPUNCTURE: CPT | Performed by: NURSE PRACTITIONER

## 2020-12-11 PROCEDURE — 87340 HEPATITIS B SURFACE AG IA: CPT | Performed by: NURSE PRACTITIONER

## 2020-12-11 PROCEDURE — 120N000001 HC R&B MED SURG/OB

## 2020-12-11 PROCEDURE — 80048 BASIC METABOLIC PNL TOTAL CA: CPT | Performed by: INTERNAL MEDICINE

## 2020-12-11 PROCEDURE — 36415 COLL VENOUS BLD VENIPUNCTURE: CPT | Performed by: INTERNAL MEDICINE

## 2020-12-11 PROCEDURE — 250N000013 HC RX MED GY IP 250 OP 250 PS 637: Performed by: INTERNAL MEDICINE

## 2020-12-11 PROCEDURE — 86481 TB AG RESPONSE T-CELL SUSP: CPT | Performed by: INTERNAL MEDICINE

## 2020-12-11 PROCEDURE — 258N000003 HC RX IP 258 OP 636: Performed by: INTERNAL MEDICINE

## 2020-12-11 PROCEDURE — 86706 HEP B SURFACE ANTIBODY: CPT | Performed by: INTERNAL MEDICINE

## 2020-12-11 PROCEDURE — 250N000013 HC RX MED GY IP 250 OP 250 PS 637: Performed by: STUDENT IN AN ORGANIZED HEALTH CARE EDUCATION/TRAINING PROGRAM

## 2020-12-11 PROCEDURE — 85027 COMPLETE CBC AUTOMATED: CPT | Performed by: INTERNAL MEDICINE

## 2020-12-11 PROCEDURE — 82306 VITAMIN D 25 HYDROXY: CPT | Performed by: NURSE PRACTITIONER

## 2020-12-11 RX ADMIN — METHYLPREDNISOLONE SODIUM SUCCINATE 30 MG: 40 INJECTION, POWDER, FOR SOLUTION INTRAMUSCULAR; INTRAVENOUS at 04:56

## 2020-12-11 RX ADMIN — PANCRELIPASE 24000 UNITS: 30000; 6000; 19000 CAPSULE, DELAYED RELEASE PELLETS ORAL at 12:01

## 2020-12-11 RX ADMIN — PANCRELIPASE 24000 UNITS: 30000; 6000; 19000 CAPSULE, DELAYED RELEASE PELLETS ORAL at 17:24

## 2020-12-11 RX ADMIN — PANCRELIPASE 24000 UNITS: 30000; 6000; 19000 CAPSULE, DELAYED RELEASE PELLETS ORAL at 08:38

## 2020-12-11 RX ADMIN — MULTIPLE VITAMINS W/ MINERALS TAB 1 TABLET: TAB at 08:38

## 2020-12-11 RX ADMIN — IRON SUCROSE 300 MG: 20 INJECTION, SOLUTION INTRAVENOUS at 09:00

## 2020-12-11 RX ADMIN — METHYLPREDNISOLONE SODIUM SUCCINATE 30 MG: 40 INJECTION, POWDER, FOR SOLUTION INTRAMUSCULAR; INTRAVENOUS at 17:24

## 2020-12-11 RX ADMIN — PANTOPRAZOLE SODIUM 40 MG: 40 TABLET, DELAYED RELEASE ORAL at 08:38

## 2020-12-11 ASSESSMENT — ACTIVITIES OF DAILY LIVING (ADL)
ADLS_ACUITY_SCORE: 16
ADLS_ACUITY_SCORE: 17
ADLS_ACUITY_SCORE: 16
ADLS_ACUITY_SCORE: 16
ADLS_ACUITY_SCORE: 17
ADLS_ACUITY_SCORE: 16

## 2020-12-11 NOTE — PLAN OF CARE
Vitals stable. Tele-NSR. A&O, independent. Tolerating full liquids. Still having small loose blood streaked stools. C diff back negative. Last hgb 9.8, next draw in AM. Started IV steroids, discharge pending improvement.

## 2020-12-11 NOTE — PROGRESS NOTES
"[facility] Progress Note     Interval History:    Doing ok. Hgb stable. 9-10 stools total today thus far, still bloody with clots but slightly more formed, becoming \"oatmeal\" consistency this morning. No mucous. Some cramping just prior to a stool, otherwise denies pain.     Physical Exam:    BP 96/59 (BP Location: Right arm)   Pulse 71   Temp 98.3  F (36.8  C) (Oral)   Resp 16   Ht 1.727 m (5' 8\")   Wt 51.3 kg (113 lb)   SpO2 99%   BMI 17.18 kg/m    Temp (24hrs), Av.9  F (36.6  C), Min:96.5  F (35.8  C), Max:98.3  F (36.8  C)    Patient Vitals for the past 72 hrs:   Weight   20 0917 51.3 kg (113 lb)       Intake/Output Summary (Last 24 hours) at 2020 1159  Last data filed at 12/10/2020 1800  Gross per 24 hour   Intake 1220 ml   Output 200 ml   Net 1020 ml       Constitutional: No acute distress, thin   Cardiovascular: RRR, normal S1/S2  Respiratory: Effort normal, CTA bilaterally  Abdomen: Soft, nondistended, nontender  Skin: pale      Laboratory Data  Recent Labs   Lab Test 20  0913 12/10/20  1853 12/10/20  1217 20  0750 20  0750 20  1640   WBC 13.8*  --   --   --  8.3 12.8*   HGB 9.3* 9.8* 9.8*   < > 4.5* 3.1*   MCV 78  --   --   --  74* 67*   *  --   --   --  399 622*   INR  --   --   --   --   --  1.10    < > = values in this interval not displayed.     Recent Labs   Lab Test 20  0913 20  0750 20  1640    133 134   POTASSIUM 4.4 4.1 3.9   CHLORIDE 102 104 102   CO2 26 26 26   BUN 9 11 17   CR 0.95 0.93 1.05   ANIONGAP 8 3 6   KIARA 8.6 7.8* 8.4*     Recent Labs   Lab Test 20  0750 20  1640 16  0815 16  1658   ALBUMIN 1.8* 1.8*  --  4.3   BILITOTAL 0.4 0.1*  --  0.8   DBIL  --  <0.1  --   --    ALT 12 16  --  21   AST 13 12  --  17   ALKPHOS 81 106  --  110   LIPASE  --  29* 956* 1,816*   AMYLASE  --   --   --  73       Imaging  CT Abdomen/Pelvis 2020   IMPRESSION:  1. Although the colon is not distended, there is " diffuse colonic wall  thickening, which can be seen with colitis.  2. Multiple bibasilar pulmonary nodules, of indeterminate etiology,  could be infectious or less likely neoplastic given the lack of  patient's history of malignancy. Recommend follow-up exam in 3 months  to ensure resolution.  3. Atrophic appearance of the pancreas of indeterminate etiology.      Endoscopic Workup  EGD 12/10/2020  Findings:        LA Grade A (one or more mucosal breaks less than 5 mm, not extending        between tops of 2 mucosal folds) esophagitis with no bleeding was found        in the lower third of the esophagus. GE junction at 40 cm.        No gross lesions were noted in the entire examined stomach. Random        gastric biopsies taken.        No gross lesions were noted in the entire examined duodenum. Biopsies        for histology were taken with a cold forceps for evaluation of celiac        disease.                                                                                     Impression:               - LA Grade A reflux esophagitis.                             - No obvious reason for anemia on this exam   Recommendation:           - Await pathology results.       Colonoscopy 12/10/2020  Findings:        The terminal ileum appeared normal.        Diffuse severe inflammation characterized by adherent blood, altered        vascularity, congestion (edema), friability, granularity, loss of        vascularity and shallow ulcerations was found in the entire colon        (rectum to cecum). Biopsies were taken with a cold forceps for histology.        Retroflexion not done due to severity of inflammation.                                                                                     Impression:               - The examined portion of the ileum was normal.                             - Diffuse severe inflammation was found in the                             entire examined colon. Biopsied. Appearance and                              clinic course consistent with UC. Likely won't get                             bx results until early next year. Will emperically                             start IV steroids given severity of disease.   Recommendation:           - Return patient to [facility] for ongoing care.                             - Full liquid diet today.                             - Await pathology results.                             - Use methylprednisolone 30 mg IV BID today.         Assessment & Plan:  29 yo male with history of chronic idiopathic pancreatitis and pancreatic insufficiency (dx 2016, had EUS) who presented 12/8 with severe anemia with a hemoglobin of 3.1 and recent symptoms of diarrhea, hematochezia, nausea, vomiting over the last several months. Was having ~20 stools a day at home PTA. Blood work done at [facility] revealed hemoglobin of 3.1, elevated WBC, elevated platelets so he was instructed to present to the hospital for further evaluation.  CT A/P showed diffuse colitis.  Stool studies negative/c. diff negative. EGD 1210 showed LA Grade A esophagitis. Colonoscopy 12/10 showed diffuse, severe inflammation from rectum to cecum, biopsies are pending. Steroids started 12/10 at 1600.        Plan  Colitis, suspected IBD     -Continue methylprednisolone 30mg IV BID   -Await pathology results   -OK to advance diet to low residue/low fat   -Monitor stools/monitor frequency  -Monitor hgb and transfuse to keep hgb >7  -Received iron transfusion x2 this hospital stay  -Hep B/quantiferon pending in anticipation of need to initiate biologic treatment in the future   -DVT prophylaxis held d/t severe anemia/bleeding on admit   -CRS consultation if condition worsens   -Addendum: briefly discussed maintence medications for ulcerative colitis including Remicade or mesalamine. IBD medication brochure/handout from the [employer] given to the patient. He understands this is the suspected diagnosis, but  that it will be determined by the biopsy results and that the diagnosis/treatment will be discussed in greater detail when confirmed.     Esophagitis  -PPI daily     Pancreatic insufficiency   -Continue creon, 90K with meals  -Possible autoimmune etiology of chronic pancreatitis?     Discussed with Dr. Roman Mendez, [facility]  Cell: [phone]  Office: [phone]

## 2020-12-11 NOTE — PROGRESS NOTES
[facility]    Medicine Progress Note - Hospitalist Service       Date of Admission:  12/8/2020  Assessment & Plan                Profound microcytic anemia, likely Fe deficiency  Acute blood loss anemia  Possible ulcerative colitis with acute flareup  Numerous bowel movements since October accompanied by 8 lbs weight loss  He did not notice bleeding  likely slow chronic blood loss anemia however will order LDH and haptoglobin to r/o lysis  CT imaging with diffuse colonic wall thickening suggestive of colitis  typed for 2 units of blood in the ED, which the nursing reports transfused without difficulty overnight  Small amount of blood striking in his stools  plan  - [facility] consulted  Patient went underwent a EGD which showed LA grade a reflux esophagitis.  Recommended Protonix daily for 1 week as per GI  Colonoscopy findings consistent with possible ulcerative colitis  Started on methylprednisolone 30 mg IV twice daily as per [facility] to help with ulcerative colitis  With blood transfusions hemoglobin improved to 9.3 today  Patient was still bleeding with multiple BMs today   IV iron transfusion 2 doses ordered and given  -   Pancreatitis insufficency  [facility] may have more information but patient reports episode of pancreatitis a few years ago (he was told it was pancreatitis) with resulting pancreatitic insufficiency  -he denies any known etiology for his pancreatitis, ? CF  -atrophic pancreatitis on imaging  -continue PTA Creon     Hypoalbuminemia  Albumin of 1.8  possibly related to malnutrition and/or chronic diarrhea  Plan  - albumin prn, but at this point he likely still needs blood  -consult nutrition     Pulmonary nodules  -unclear etiology  -will need 3 month follow up CT, defer to PCP     Diet: Low Fiber Diet    DVT Prophylaxis: Pneumatic Compression Devices  Alexander Catheter: not present  Code Status: Full Code           Disposition Plan   Expected discharge: 2 - 3 days, recommended  to prior living arrangement once hemoglobin stable and diarrhea and bleeding improves.  Entered: Teresa Downey MD 12/11/2020, 1:50 PM       The patient's care was discussed with the Patient.    Teresa Downey MD   Page [phone](7AM-6PM)      ______________________________________________________________________    Interval History   Patient  Was having multiple  bloody BMs today. Feels weak and tired    Data reviewed today: I reviewed all medications, new labs and imaging results over the last 24 hours. I personally reviewed   CT with colitis and pulmonary nodules in bilateral bases.  EKG NSR    Physical Exam   Vital Signs: Temp: 98.3  F (36.8  C) Temp src: Oral BP: 96/59 Pulse: 71   Resp: 16 SpO2: 99 % O2 Device: None (Room air)    Weight: 113 lbs 0 oz  Constitutional: Awake, alert, cooperative, very pale, thin and weak  Eyes: Conjunctiva and pupils examined and normal.  HEENT: dry, mucous membrane  Respiratory: Clear to auscultation bilaterally, no crackles or wheezing.  Cardiovascular: tachycardic regular rhythm, normal S1 and S2, and no murmur noted.  GI: Soft, non-distended, non-tender, normal bowel sounds.  Lymph/Hematologic: No anterior cervical or supraclavicular adenopathy.  Skin: No rashes, no cyanosis, no edema.  Musculoskeletal: No joint swelling, erythema or tenderness.  Neurologic: Cranial nerves 2-12 intact, normal strength and sensation.  Psychiatric: Alert, oriented to person, place and time, flat affect       Data   Recent Labs   Lab 12/11/20  0913 12/10/20  1853 12/10/20  1217 12/09/20  0750 12/09/20  0750 12/08/20  1640   WBC 13.8*  --   --   --  8.3 12.8*   HGB 9.3* 9.8* 9.8*   < > 4.5* 3.1*   MCV 78  --   --   --  74* 67*   *  --   --   --  399 622*   INR  --   --   --   --   --  1.10     --   --   --  133 134   POTASSIUM 4.4  --   --   --  4.1 3.9   CHLORIDE 102  --   --   --  104 102   CO2 26  --   --   --  26 26   BUN 9  --   --   --  11 17   CR 0.95  --   --   --  0.93 1.05    ANIONGAP 8  --   --   --  3 6   KIARA 8.6  --   --   --  7.8* 8.4*   *  --   --   --  91 118*   ALBUMIN  --   --   --   --  1.8* 1.8*   PROTTOTAL  --   --   --   --  5.6* 6.9   BILITOTAL  --   --   --   --  0.4 0.1*   ALKPHOS  --   --   --   --  81 106   ALT  --   --   --   --  12 16   AST  --   --   --   --  13 12   LIPASE  --   --   --   --   --  29*    < > = values in this interval not displayed.     No results found for this or any previous visit (from the past 24 hour(s)).  Medications       amylase-lipase-protease  4 capsule Oral TID w/meals     methylPREDNISolone  30 mg Intravenous Q12H     multivitamin w/minerals  1 tablet Oral Daily     pantoprazole  40 mg Oral QAM AC     sodium chloride (PF)  3 mL Intracatheter Q8H

## 2020-12-11 NOTE — PLAN OF CARE
Pt here with UC, anemia. A&Ox4. VSS on RA. Tele NSR. Full Liq diet. CT yesterday, Started on PPI, solumedrol. Up Independent, no dizziness/lightheadedness reported. 3 stools overnight per pt. Denies pain. PIV SL. Hgb pending. Discharge home when diarrhea/bleeding resolves 1-2 days likely.

## 2020-12-11 NOTE — PLAN OF CARE
A&O x4. Up ad leda. Tele SR. Lungs clear. Tolerating low fiber diet. Pt said diarrhea is improving and more formed. Voiding without difficulty. Plan is to maintain IV steroids. IV Fe given per shift. Should discharge when diarrhea improves. Hgb was 9.3 per shift.

## 2020-12-11 NOTE — PLAN OF CARE
DATE & TIME: 12/11/2020, 9987-0442   Cognitive Concerns/ Orientation: A&Ox4  BEHAVIOR & AGGRESSION TOOL COLOR: Green.  CIWA SCORE: N/A   ABNL VS/O2: VSS on RA. Soft BP of 110/61 and 96/59 this morning.   MOBILITY: Independent.  PAIN MANAGMENT: Mild tenderness in abdominal area, otherwise denies pain.   DIET: Low fiber.  BOWEL/BLADDER: Continent of both. Stools are less bloody and loose per pt report.   ABNL LAB/BG: Hgb: 9.8 and 9.3 this morning.  DRAIN/DEVICES: 1 IV in left AC and 1 IV in right AC.   TELEMETRY RHYTHM: Sinus rhythm.   SKIN: Intact and pale.  TESTS/PROCEDURES: N/A  D/C DAY/GOALS/PLACE: Estimated discharge in 2-3 days pending stable hemoglobin and bleeding and diarrhea improvement.   OTHER IMPORTANT INFO: N/A

## 2020-12-12 LAB
BLD PROD TYP BPU: NORMAL
BLD UNIT ID BPU: 0
BLOOD PRODUCT CODE: NORMAL
BPU ID: NORMAL
CRP SERPL-MCNC: 15.8 MG/L (ref 0–8)
ERYTHROCYTE [DISTWIDTH] IN BLOOD BY AUTOMATED COUNT: 20.5 % (ref 10–15)
GAMMA INTERFERON BACKGROUND BLD IA-ACNC: 0.02 IU/ML
HCT VFR BLD AUTO: 29.1 % (ref 40–53)
HGB BLD-MCNC: 9.2 G/DL (ref 13.3–17.7)
M TB IFN-G CD4+ BCKGRND COR BLD-ACNC: 0.06 IU/ML
M TB TUBERC IFN-G BLD QL: ABNORMAL
MCH RBC QN AUTO: 25.4 PG (ref 26.5–33)
MCHC RBC AUTO-ENTMCNC: 31.6 G/DL (ref 31.5–36.5)
MCV RBC AUTO: 80 FL (ref 78–100)
MITOGEN IGNF BCKGRD COR BLD-ACNC: 0 IU/ML
MITOGEN IGNF BCKGRD COR BLD-ACNC: 0 IU/ML
PLATELET # BLD AUTO: 465 10E9/L (ref 150–450)
RBC # BLD AUTO: 3.62 10E12/L (ref 4.4–5.9)
TRANSFUSION STATUS PATIENT QL: NORMAL
WBC # BLD AUTO: 12.7 10E9/L (ref 4–11)

## 2020-12-12 PROCEDURE — 85027 COMPLETE CBC AUTOMATED: CPT | Performed by: INTERNAL MEDICINE

## 2020-12-12 PROCEDURE — 250N000013 HC RX MED GY IP 250 OP 250 PS 637: Performed by: INTERNAL MEDICINE

## 2020-12-12 PROCEDURE — 86140 C-REACTIVE PROTEIN: CPT | Performed by: INTERNAL MEDICINE

## 2020-12-12 PROCEDURE — 120N000001 HC R&B MED SURG/OB

## 2020-12-12 PROCEDURE — 250N000013 HC RX MED GY IP 250 OP 250 PS 637: Performed by: STUDENT IN AN ORGANIZED HEALTH CARE EDUCATION/TRAINING PROGRAM

## 2020-12-12 PROCEDURE — 36415 COLL VENOUS BLD VENIPUNCTURE: CPT | Performed by: INTERNAL MEDICINE

## 2020-12-12 PROCEDURE — 250N000011 HC RX IP 250 OP 636: Performed by: INTERNAL MEDICINE

## 2020-12-12 PROCEDURE — 99232 SBSQ HOSP IP/OBS MODERATE 35: CPT | Performed by: INTERNAL MEDICINE

## 2020-12-12 RX ADMIN — METHYLPREDNISOLONE SODIUM SUCCINATE 30 MG: 40 INJECTION, POWDER, FOR SOLUTION INTRAMUSCULAR; INTRAVENOUS at 17:05

## 2020-12-12 RX ADMIN — MULTIPLE VITAMINS W/ MINERALS TAB 1 TABLET: TAB at 08:50

## 2020-12-12 RX ADMIN — METHYLPREDNISOLONE SODIUM SUCCINATE 30 MG: 40 INJECTION, POWDER, FOR SOLUTION INTRAMUSCULAR; INTRAVENOUS at 04:21

## 2020-12-12 RX ADMIN — PANCRELIPASE 24000 UNITS: 30000; 6000; 19000 CAPSULE, DELAYED RELEASE PELLETS ORAL at 08:50

## 2020-12-12 RX ADMIN — PANCRELIPASE 24000 UNITS: 30000; 6000; 19000 CAPSULE, DELAYED RELEASE PELLETS ORAL at 17:05

## 2020-12-12 RX ADMIN — PANTOPRAZOLE SODIUM 40 MG: 40 TABLET, DELAYED RELEASE ORAL at 06:50

## 2020-12-12 RX ADMIN — PANCRELIPASE 24000 UNITS: 30000; 6000; 19000 CAPSULE, DELAYED RELEASE PELLETS ORAL at 12:58

## 2020-12-12 RX ADMIN — PANCRELIPASE 12000 UNITS: 30000; 6000; 19000 CAPSULE, DELAYED RELEASE PELLETS ORAL at 06:50

## 2020-12-12 ASSESSMENT — ACTIVITIES OF DAILY LIVING (ADL)
ADLS_ACUITY_SCORE: 16

## 2020-12-12 NOTE — PLAN OF CARE
A&Ox4. VSS on RA, tele: NSR. Denies pain/nausea. Low fiber diet, tolerating small meals throughout the day. Continues to have loose/bloody stools, per pt report stools are smaller & less frequent. PIV SL, int IV steroids. Hemoglobin 9.2. Discharge home pending improvement.

## 2020-12-12 NOTE — PROGRESS NOTES
[facility]    Medicine Progress Note - Hospitalist Service       Date of Admission:  12/8/2020  Assessment & Plan                Profound microcytic anemia, likely Fe deficiency  Acute blood loss anemia  Possible ulcerative colitis with acute flareup  Numerous bowel movements since October accompanied by 8 lbs weight loss  He did not notice bleeding  likely slow chronic blood loss anemia however will order LDH and haptoglobin to r/o lysis  CT imaging with diffuse colonic wall thickening suggestive of colitis  typed for 2 units of blood in the ED, which the nursing reports transfused without difficulty overnight  Small amount of blood striking in his stools  plan  - [facility] consulted  Patient went underwent a EGD which showed LA grade a reflux esophagitis.  Recommended Protonix daily for 1 week as per GI  Colonoscopy findings consistent with possible ulcerative colitis  Started on methylprednisolone 30 mg IV twice daily as per [facility] to help with ulcerative colitis  With blood transfusions hemoglobin improved to 9.3 today  Patient was still bleeding with multiple BMs today   IV iron transfusion 2 doses ordered and given  -   Pancreatitis insufficency  [facility] may have more information but patient reports episode of pancreatitis a few years ago (he was told it was pancreatitis) with resulting pancreatitic insufficiency  -he denies any known etiology for his pancreatitis, ? CF  -atrophic pancreatitis on imaging  -continue PTA Creon     Hypoalbuminemia  Albumin of 1.8  possibly related to malnutrition and/or chronic diarrhea  Plan  - albumin prn, but at this point he likely still needs blood  -consult nutrition     Pulmonary nodules  -unclear etiology  -will need 3 month follow up CT, defer to PCP     Diet: Low Fiber Diet    DVT Prophylaxis: Pneumatic Compression Devices  Alexander Catheter: not present  Code Status: Full Code           Disposition Plan   Expected discharge: 2 - 3 days, recommended  to prior living arrangement once hemoglobin stable and diarrhea and bleeding improves.  Entered: Teresa Downey MD 12/12/2020, 3:22 PM       The patient's care was discussed with the Patient.    Teresa Downey MD   Page [phone](7AM-6PM)      ______________________________________________________________________    Interval History   Patient  Was having multiple  bloody BMs today. Feels weak and tired with poor appetite     Data reviewed today: I reviewed all medications, new labs and imaging results over the last 24 hours. I personally reviewed   CT with colitis and pulmonary nodules in bilateral bases.  EKG NSR    Physical Exam   Vital Signs: Temp: 98  F (36.7  C) Temp src: Oral BP: 110/64 Pulse: 66   Resp: 16 SpO2: 99 % O2 Device: None (Room air)    Weight: 113 lbs 0 oz  Constitutional: Awake, alert, cooperative, very pale, thin and weak  Eyes: Conjunctiva and pupils examined and normal.  HEENT: dry, mucous membrane  Respiratory: Clear to auscultation bilaterally, no crackles or wheezing.  Cardiovascular normal rate  regular rhythm, normal S1 and S2, and no murmur noted.  GI: Soft, non-distended, non-tender, normal bowel sounds.  Lymph/Hematologic: No anterior cervical or supraclavicular adenopathy.  Skin: No rashes, no cyanosis, no edema.  Musculoskeletal: No joint swelling, erythema or tenderness.  Neurologic: Cranial nerves 2-12 intact, normal strength and sensation.  Psychiatric: Alert, oriented to person, place and time, flat affect       Data   Recent Labs   Lab 12/12/20  0929 12/11/20  0913 12/10/20  1853 12/09/20  0750 12/09/20  0750 12/08/20  1640   WBC 12.7* 13.8*  --   --  8.3 12.8*   HGB 9.2* 9.3* 9.8*   < > 4.5* 3.1*   MCV 80 78  --   --  74* 67*   * 454*  --   --  399 622*   INR  --   --   --   --   --  1.10   NA  --  136  --   --  133 134   POTASSIUM  --  4.4  --   --  4.1 3.9   CHLORIDE  --  102  --   --  104 102   CO2  --  26  --   --  26 26   BUN  --  9  --   --  11 17   CR  --  0.95  --    --  0.93 1.05   ANIONGAP  --  8  --   --  3 6   KIARA  --  8.6  --   --  7.8* 8.4*   GLC  --  104*  --   --  91 118*   ALBUMIN  --   --   --   --  1.8* 1.8*   PROTTOTAL  --   --   --   --  5.6* 6.9   BILITOTAL  --   --   --   --  0.4 0.1*   ALKPHOS  --   --   --   --  81 106   ALT  --   --   --   --  12 16   AST  --   --   --   --  13 12   LIPASE  --   --   --   --   --  29*    < > = values in this interval not displayed.     No results found for this or any previous visit (from the past 24 hour(s)).  Medications       amylase-lipase-protease  4 capsule Oral TID w/meals     methylPREDNISolone  30 mg Intravenous Q12H     multivitamin w/minerals  1 tablet Oral Daily     pantoprazole  40 mg Oral QAM AC     sodium chloride (PF)  3 mL Intracatheter Q8H

## 2020-12-12 NOTE — PLAN OF CARE
Pt A&O x 4, VSS on RA. Tele sinus dysrythmia, low fiber diet. Up independent, appears weak and pale. Continues to have multiple loose bloody stools reported by pt. Continues on solu-medrol and protonix. Denies significant pain other than mild abd discomfort. PIV SL x 2. Discharge home pending when diarrhea and bleeding resolves.

## 2020-12-12 NOTE — PROGRESS NOTES
"[facility] Progress Note     IMPRESSION:  Chronic idiopathic pancreatitis/pancreatic insufficiency  Apparent new diagnosis of ulcerative colitis, path pending  Protein malnutrition/sarcopenia  Leukocytosis related to steroid induced demargination    Day #2 of steroid therapy, not yet having a meaningful response.  We are awaiting QuantiFERON result, and histopathology.  Given concomitant use of steroids, QuantiFERON may be indeterminant, though the patient denies any risk for active/latent tuberculosis.  National guidelines for inpatient management of ulcerative colitis recommend against prolonged IV steroids (> 5 days), in favor of rapid initiation of Remicade therapy.  Therefore, if he is still without significant response by Monday, we will proceed with Remicade therapy.    RECOMMENDATIONS:  Continue to advance diet as tolerated  Follow stool output  Await histopathology results  Encourage ambulation given high risk for DVT    I note that he is undertreated with regard to pancreatic enzyme replacement.  I will discuss this with the patient in greater detail tomorrow.  Would benefit from dosing greater than 90,000 units with meals.    Grant Perera,    [facility]  Cell [phone]    ______________________________________________________________    SUBJECTIVE:  Patient is feeling slightly improved today.  There is less abdominal pain, but no real response to steroids with regard to bowel habits.  Greater than 12 bowel movements yesterday, greater than 5 overnight.  All bowel movements contain blood.  Tolerating diet, however without nausea.  No vomiting.     OBJECTIVE:  /65 (BP Location: Left arm)   Pulse 72   Temp 97.9  F (36.6  C) (Oral)   Resp 16   Ht 1.727 m (5' 8\")   Wt 51.3 kg (113 lb)   SpO2 96%   BMI 17.18 kg/m    Temp (24hrs), Av  F (36.7  C), Min:97.8  F (36.6  C), Max:98.4  F (36.9  C)    No data found.  No intake or output data in the 24 hours ending 20 " 1048     PHYSICAL EXAM  GEN: Pale, oriented x3, communicative and in NAD.    ABD: ND, +BS, no guarding or pain to palpation, no rebound, no HSM.  SKIN: No rash, jaundice or spider angiomata      Additional Data:  I have reviewed the patient's new clinical lab results:     Recent Labs   Lab Test 12/12/20  0929 12/11/20  0913 12/10/20  1853 12/09/20  0750 12/09/20  0750 12/08/20  1640   WBC 12.7* 13.8*  --   --  8.3 12.8*   HGB 9.2* 9.3* 9.8*   < > 4.5* 3.1*   MCV 80 78  --   --  74* 67*   * 454*  --   --  399 622*   INR  --   --   --   --   --  1.10    < > = values in this interval not displayed.     Recent Labs   Lab Test 12/11/20 0913 12/09/20 0750 12/08/20  1640   POTASSIUM 4.4 4.1 3.9   CHLORIDE 102 104 102   CO2 26 26 26   BUN 9 11 17   ANIONGAP 8 3 6     Recent Labs   Lab Test 12/09/20  0750 12/08/20  1640 05/24/16  0815 05/18/16  1658   ALBUMIN 1.8* 1.8*  --  4.3   BILITOTAL 0.4 0.1*  --  0.8   ALT 12 16  --  21   AST 13 12  --  17   LIPASE  --  29* 956* 1,816*   AMYLASE  --   --   --  73

## 2020-12-13 PROCEDURE — 250N000013 HC RX MED GY IP 250 OP 250 PS 637: Performed by: STUDENT IN AN ORGANIZED HEALTH CARE EDUCATION/TRAINING PROGRAM

## 2020-12-13 PROCEDURE — 99232 SBSQ HOSP IP/OBS MODERATE 35: CPT | Performed by: INTERNAL MEDICINE

## 2020-12-13 PROCEDURE — 250N000013 HC RX MED GY IP 250 OP 250 PS 637: Performed by: INTERNAL MEDICINE

## 2020-12-13 PROCEDURE — 120N000001 HC R&B MED SURG/OB

## 2020-12-13 PROCEDURE — 250N000011 HC RX IP 250 OP 636: Performed by: INTERNAL MEDICINE

## 2020-12-13 RX ORDER — CHOLECALCIFEROL (VITAMIN D3) 50 MCG
50 TABLET ORAL DAILY
Status: DISCONTINUED | OUTPATIENT
Start: 2020-12-13 | End: 2020-12-18 | Stop reason: HOSPADM

## 2020-12-13 RX ORDER — CHOLECALCIFEROL (VITAMIN D3) 1250 MCG
1250 CAPSULE ORAL
Status: DISCONTINUED | OUTPATIENT
Start: 2020-12-13 | End: 2020-12-13

## 2020-12-13 RX ORDER — FERROUS SULFATE 325(65) MG
325 TABLET ORAL 2 TIMES DAILY WITH MEALS
Status: DISCONTINUED | OUTPATIENT
Start: 2020-12-13 | End: 2020-12-14

## 2020-12-13 RX ADMIN — METHYLPREDNISOLONE SODIUM SUCCINATE 30 MG: 40 INJECTION, POWDER, FOR SOLUTION INTRAMUSCULAR; INTRAVENOUS at 16:21

## 2020-12-13 RX ADMIN — FERROUS SULFATE TAB 325 MG (65 MG ELEMENTAL FE) 325 MG: 325 (65 FE) TAB at 16:21

## 2020-12-13 RX ADMIN — PANCRELIPASE 24000 UNITS: 30000; 6000; 19000 CAPSULE, DELAYED RELEASE PELLETS ORAL at 16:21

## 2020-12-13 RX ADMIN — FERROUS SULFATE TAB 325 MG (65 MG ELEMENTAL FE) 325 MG: 325 (65 FE) TAB at 09:54

## 2020-12-13 RX ADMIN — MULTIPLE VITAMINS W/ MINERALS TAB 1 TABLET: TAB at 09:26

## 2020-12-13 RX ADMIN — Medication 50 MCG: at 13:47

## 2020-12-13 RX ADMIN — PANCRELIPASE 24000 UNITS: 30000; 6000; 19000 CAPSULE, DELAYED RELEASE PELLETS ORAL at 11:18

## 2020-12-13 RX ADMIN — PANTOPRAZOLE SODIUM 40 MG: 40 TABLET, DELAYED RELEASE ORAL at 06:26

## 2020-12-13 RX ADMIN — METHYLPREDNISOLONE SODIUM SUCCINATE 30 MG: 40 INJECTION, POWDER, FOR SOLUTION INTRAMUSCULAR; INTRAVENOUS at 06:25

## 2020-12-13 RX ADMIN — PANCRELIPASE 24000 UNITS: 30000; 6000; 19000 CAPSULE, DELAYED RELEASE PELLETS ORAL at 06:56

## 2020-12-13 ASSESSMENT — ACTIVITIES OF DAILY LIVING (ADL)
ADLS_ACUITY_SCORE: 16
ADLS_ACUITY_SCORE: 14
ADLS_ACUITY_SCORE: 16

## 2020-12-13 NOTE — PLAN OF CARE
Patient is A&Ox4. VSS. Denies pain and nausea. Up independently to bathroom. Several loose bloody stools noted. Telemetry was NSR. Calls appropriately.

## 2020-12-13 NOTE — PLAN OF CARE
Problem: Adult Inpatient Plan of Care  Goal: Plan of Care Review  Outcome: No Change   VSS, A/O, ambulating in room independently.  Low fiber diet, tolerating small meals throughout the day, using Creon independently with meals.  Several loose stools per pt, mucousy red/blood mixture, pt keeping track on whiteboard.  Tele NSR.  Discharge pending improvement in diarrhea and and stable Hgb.

## 2020-12-13 NOTE — PROGRESS NOTES
"[facility] Progress Note     IMPRESSION:  Chronic idiopathic pancreatitis/pancreatic insufficiency  Apparent new diagnosis of ulcerative colitis path pending hopefully returning tomorrow, specifically looking for lack of evidence of CMV prior to starting Remicade  Protein malnutrition/sarcopenia  Leukocytosis related to steroids  Normocytic anemia, multifactorial including acute blood loss anemia    RECOMMENDATIONS:  Diet as tolerated  Continue IV steroids, day #3  Follow-up QuantiFERON, may be indeterminant given steroids  Ambulation, high risk for DVT  We discussed pancreatic enzyme replacement.  He recalls this dose being very effective for him upon initiation and is not interested in further dose escalation.    Greater than 30 minutes spent discussing care today with the patient.    Grant Perera, DO   [facility]  Cell [phone]    ________________________________________________________________________      SUBJECTIVE:  6-8 stools overnight continued diarrhea throughout the day mild improvement in bleeding but otherwise minimal response to steroids.  Greater than 30-minute discussion with the patient today again about Remicade, goals/risks/alternatives to therapy including colectomy.  Patient motivated to potentially start Remicade, expresses understanding of the long-term commitment required by the patient.  All questions answered.       OBJECTIVE:  /60 (BP Location: Left arm)   Pulse 62   Temp 98  F (36.7  C) (Oral)   Resp 16   Ht 1.727 m (5' 8\")   Wt 51.3 kg (113 lb)   SpO2 98%   BMI 17.18 kg/m    Temp (24hrs), Av.2  F (36.8  C), Min:97.9  F (36.6  C), Max:98.7  F (37.1  C)    No data found.  No intake or output data in the 24 hours ending 20 1609     PHYSICAL EXAM  GEN: Alert, oriented x3, communicative and in NAD.    ABD: Nondistended, normal bowel sounds, diffusely tender with moderate palpation.    Additional Data:  I have reviewed the patient's new " clinical lab results:     Recent Labs   Lab Test 12/12/20  0929 12/11/20  0913 12/10/20  1853 12/09/20  0750 12/09/20 0750 12/08/20  1640   WBC 12.7* 13.8*  --   --  8.3 12.8*   HGB 9.2* 9.3* 9.8*   < > 4.5* 3.1*   MCV 80 78  --   --  74* 67*   * 454*  --   --  399 622*   INR  --   --   --   --   --  1.10    < > = values in this interval not displayed.     Recent Labs   Lab Test 12/11/20 0913 12/09/20 0750 12/08/20  1640   POTASSIUM 4.4 4.1 3.9   CHLORIDE 102 104 102   CO2 26 26 26   BUN 9 11 17   ANIONGAP 8 3 6     Recent Labs   Lab Test 12/09/20 0750 12/08/20  1640 05/24/16  0815 05/18/16  1658   ALBUMIN 1.8* 1.8*  --  4.3   BILITOTAL 0.4 0.1*  --  0.8   ALT 12 16  --  21   AST 13 12  --  17   LIPASE  --  29* 956* 1,816*   AMYLASE  --   --   --  73

## 2020-12-13 NOTE — PLAN OF CARE
A&Ox4. VSS on RA. Denies pain/nausea. Up ind. Continues to have frequent loose/bloody stools, improving per patient. Creon given independently with meals. GI following, discharge pending.

## 2020-12-13 NOTE — PLAN OF CARE
Pt A&O x 4, up in room independently. VSS. Pt denied pain or nausea. Pt wanting to sleep and not be disturbed overnight. Continues to have loose bloody stools. Tele NSR. Calls as needed.

## 2020-12-14 ENCOUNTER — APPOINTMENT (OUTPATIENT)
Dept: GENERAL RADIOLOGY | Facility: CLINIC | Age: 30
DRG: 385 | End: 2020-12-14
Attending: INTERNAL MEDICINE
Payer: COMMERCIAL

## 2020-12-14 LAB
COPATH REPORT: NORMAL
ERYTHROCYTE [DISTWIDTH] IN BLOOD BY AUTOMATED COUNT: 22.1 % (ref 10–15)
HCT VFR BLD AUTO: 31.7 % (ref 40–53)
HGB BLD-MCNC: 9.8 G/DL (ref 13.3–17.7)
MCH RBC QN AUTO: 26 PG (ref 26.5–33)
MCHC RBC AUTO-ENTMCNC: 30.9 G/DL (ref 31.5–36.5)
MCV RBC AUTO: 84 FL (ref 78–100)
PLATELET # BLD AUTO: 487 10E9/L (ref 150–450)
RBC # BLD AUTO: 3.77 10E12/L (ref 4.4–5.9)
WBC # BLD AUTO: 10.1 10E9/L (ref 4–11)

## 2020-12-14 PROCEDURE — 250N000013 HC RX MED GY IP 250 OP 250 PS 637: Performed by: STUDENT IN AN ORGANIZED HEALTH CARE EDUCATION/TRAINING PROGRAM

## 2020-12-14 PROCEDURE — 250N000011 HC RX IP 250 OP 636: Performed by: INTERNAL MEDICINE

## 2020-12-14 PROCEDURE — 36415 COLL VENOUS BLD VENIPUNCTURE: CPT | Performed by: NURSE PRACTITIONER

## 2020-12-14 PROCEDURE — 250N000013 HC RX MED GY IP 250 OP 250 PS 637: Performed by: INTERNAL MEDICINE

## 2020-12-14 PROCEDURE — 99232 SBSQ HOSP IP/OBS MODERATE 35: CPT | Performed by: HOSPITALIST

## 2020-12-14 PROCEDURE — 71046 X-RAY EXAM CHEST 2 VIEWS: CPT

## 2020-12-14 PROCEDURE — 120N000001 HC R&B MED SURG/OB

## 2020-12-14 PROCEDURE — 85027 COMPLETE CBC AUTOMATED: CPT | Performed by: NURSE PRACTITIONER

## 2020-12-14 RX ADMIN — FERROUS SULFATE TAB 325 MG (65 MG ELEMENTAL FE) 325 MG: 325 (65 FE) TAB at 06:31

## 2020-12-14 RX ADMIN — Medication 50 MCG: at 06:30

## 2020-12-14 RX ADMIN — PANCRELIPASE 24000 UNITS: 30000; 6000; 19000 CAPSULE, DELAYED RELEASE PELLETS ORAL at 16:06

## 2020-12-14 RX ADMIN — METHYLPREDNISOLONE SODIUM SUCCINATE 30 MG: 40 INJECTION, POWDER, FOR SOLUTION INTRAMUSCULAR; INTRAVENOUS at 18:01

## 2020-12-14 RX ADMIN — PANTOPRAZOLE SODIUM 40 MG: 40 TABLET, DELAYED RELEASE ORAL at 06:40

## 2020-12-14 RX ADMIN — PANCRELIPASE 24000 UNITS: 30000; 6000; 19000 CAPSULE, DELAYED RELEASE PELLETS ORAL at 06:30

## 2020-12-14 RX ADMIN — PANCRELIPASE 24000 UNITS: 30000; 6000; 19000 CAPSULE, DELAYED RELEASE PELLETS ORAL at 11:13

## 2020-12-14 RX ADMIN — METHYLPREDNISOLONE SODIUM SUCCINATE 30 MG: 40 INJECTION, POWDER, FOR SOLUTION INTRAMUSCULAR; INTRAVENOUS at 06:32

## 2020-12-14 RX ADMIN — MULTIPLE VITAMINS W/ MINERALS TAB 1 TABLET: TAB at 08:15

## 2020-12-14 ASSESSMENT — ACTIVITIES OF DAILY LIVING (ADL)
ADLS_ACUITY_SCORE: 16

## 2020-12-14 NOTE — PROGRESS NOTES
"CLINICAL NUTRITION SERVICES - REASSESSMENT NOTE      Malnutrition: (12/9)  % Weight Loss:  > 7.5% in 3 months (severe malnutrition)  % Intake:  </= 75% for >/= 1 month (severe malnutrition) - intake has fluctuated past few months  Subcutaneous Fat Loss:  None observed  Muscle Loss:  Clavicle bone region - moderate depletion, Patellar region - moderate depletion, Anterior thigh region - moderate depletion and Posterior calf region - moderate depletion  Fluid Retention:  None noted     Malnutrition Diagnosis: Severe malnutrition  In Context of:  Acute illness or injury        EVALUATION OF PROGRESS TOWARD GOALS   Diet:    Low Fiber    Intake/Tolerance:    Chart reviewed   Spoke with pt this morning  Pt tells me that he is tolerating po well  Has been ordering big meals and eating the food every few hours  \"I had 2 pieces of Urdu toast for breakfast, a few hours later I had a muffin and I have a banana that I will have in a bit.\"  Notes that he is still stooling, but the amount is getting less      NEW FINDINGS:   12/9: EGD just showed mild reflux esophagitis. Colon showed severe pan colonic inflammation. Appearance is consistent with UC     Previous Goals (12/9):   Pt to receive nutrition in the next 48-72 hrs  Evaluation: Met    Previous Nutrition Diagnosis (12/9):   Unintended weight loss related to fluctuating po intake and increased diarrhea  as evidenced by unintentional wt loss of 15% past few months  Evaluation: No change        CURRENT NUTRITION DIAGNOSIS  Unintended weight loss related to fluctuating po intake and increased diarrhea  as evidenced by unintentional wt loss of 15% past few months    INTERVENTIONS  Recommendations / Nutrition Prescription  Low Fiber diet    Implementation  No intervention at this time    Goals  Pt to consume >75% of 3 meals per day      MONITORING AND EVALUATION:  Progress towards goals will be monitored and evaluated per protocol and Practice Guidelines        "

## 2020-12-14 NOTE — PLAN OF CARE
Patient is A&Ox4. Calm and cooperative. VSS. Denies pain. Having multiple bloody diarrhea. L and R PIV SL. Blanchable redness on lower spine and upper coccyx, encouraged to turn and weigh shift every hour. Up independently. Calls appropriately.

## 2020-12-14 NOTE — PROGRESS NOTES
VSS, A/O, ambulating independently in room.  Continues to have frequent loose/bloody stools, improving in frequency per patient.  Creon given with patient managing/administering independently with meals.  Discharge pending improvement in diarrhea and and stable Hgb.

## 2020-12-14 NOTE — PROGRESS NOTES
[facility]    Medicine Progress Note - Hospitalist Service       Date of Admission:  12/8/2020  Assessment & Plan                Profound microcytic anemia, likely Fe deficiency  Acute blood loss anemia  Possible ulcerative colitis with acute flareup  Numerous bowel movements since October accompanied by 8 lbs weight loss  He did not notice bleeding  likely slow chronic blood loss anemia however will order LDH and haptoglobin to r/o lysis  CT imaging with diffuse colonic wall thickening suggestive of colitis  typed for 2 units of blood in the ED, which the nursing reports transfused without difficulty overnight  Small amount of blood striking in his stools  [facility] consulted  Patient went underwent a EGD which showed LA grade a reflux esophagitis.  Recommended Protonix daily for 1 week as per GI  Colonoscopy findings consistent with possible ulcerative colitis  Started on methylprednisolone 30 mg IV twice daily as per [facility] to help with ulcerative colitis  hgb improving  Patient was still bleeding with multiple BMs today   IV iron transfusion 2 doses ordered and given.   Plan  - no iron per GI, worsens IBD  - continues on steroids  - considering remicade per GI    Quantiferon is indeterminate, possibly from steroids  - CXR ordered by GI to eval for other pulmoanry nodules    Pancreatitis insufficency  [facility] may have more information but patient reports episode of pancreatitis a few years ago (he was told it was pancreatitis) with resulting pancreatitic insufficiency  -he denies any known etiology for his pancreatitis, ? CF  -atrophic pancreatitis on imaging  -continue PTA Creon     Hypoalbuminemia  Severe malnutrition   Albumin of 1.8  possibly related to malnutrition and/or chronic diarrhea  Plan  - albumin prn, but at this point he likely still needs blood  -consulted nutrition and following      Pulmonary nodules  -unclear etiology  -will need 3 month follow up CT, defer to PCP          Diet: Low Fiber Diet    DVT Prophylaxis: Pneumatic Compression Devices  Alexander Catheter: not present  Code Status: Full Code           Disposition Plan   Expected discharge: 2 - 3 days, recommended to prior living arrangement once hemoglobin stable and diarrhea and bleeding improves.  Entered: Esteban Quigley DO 12/14/2020, 4:26 PM       The patient's care was discussed with the Patient.    Teresa Downey MD   Page [phone](7AM-6PM)      ______________________________________________________________________    Interval History   Patient  Was having multiple  bloody BMs today. Feels weak and tired with poor appetite . Little slowing of BMs per patient today . Biopsy results still pending     Data reviewed today: I reviewed all medications, new labs and imaging results over the last 24 hours. I personally reviewed   CT with colitis and pulmonary nodules in bilateral bases.  EKG NSR    Physical Exam   Vital Signs: Temp: 98.3  F (36.8  C) Temp src: Oral BP: 97/53 Pulse: 98   Resp: 16 SpO2: 99 % O2 Device: None (Room air)    Weight: 113 lbs 0 oz  Constitutional: Awake, alert, cooperative,  thin and weak appearing   Eyes: Conjunctiva and pupils examined and normal.  Respiratory: Clear to auscultation bilaterally, no crackles or wheezing.  Cardiovascular normal rate  regular rhythm, normal S1 and S2, and no murmur noted.  GI: Soft, non-distended, non-tender, normal bowel sounds.  Lymph/Hematologic: No anterior cervical or supraclavicular adenopathy.  Skin: No rashes, no cyanosis, no edema.  Musculoskeletal: No joint swelling, erythema or tenderness.  Neurologic: Cranial nerves 2-12 intact, normal strength and sensation.  Psychiatric: Alert, oriented to person, place and time       Data   Recent Labs   Lab 12/14/20  1411 12/12/20  0929 12/11/20  0913 12/09/20  0750 12/09/20  0750 12/08/20  1640   WBC 10.1 12.7* 13.8*  --  8.3 12.8*   HGB 9.8* 9.2* 9.3*   < > 4.5* 3.1*   MCV 84 80 78  --  74* 67*   * 465* 454*   --  399 622*   INR  --   --   --   --   --  1.10   NA  --   --  136  --  133 134   POTASSIUM  --   --  4.4  --  4.1 3.9   CHLORIDE  --   --  102  --  104 102   CO2  --   --  26  --  26 26   BUN  --   --  9  --  11 17   CR  --   --  0.95  --  0.93 1.05   ANIONGAP  --   --  8  --  3 6   KIARA  --   --  8.6  --  7.8* 8.4*   GLC  --   --  104*  --  91 118*   ALBUMIN  --   --   --   --  1.8* 1.8*   PROTTOTAL  --   --   --   --  5.6* 6.9   BILITOTAL  --   --   --   --  0.4 0.1*   ALKPHOS  --   --   --   --  81 106   ALT  --   --   --   --  12 16   AST  --   --   --   --  13 12   LIPASE  --   --   --   --   --  29*    < > = values in this interval not displayed.     No results found for this or any previous visit (from the past 24 hour(s)).  Medications       amylase-lipase-protease  4 capsule Oral TID w/meals     methylPREDNISolone  30 mg Intravenous Q12H     multivitamin w/minerals  1 tablet Oral Daily     pantoprazole  40 mg Oral QAM AC     sodium chloride (PF)  3 mL Intracatheter Q8H     vitamin D3  50 mcg Oral Daily

## 2020-12-14 NOTE — PROGRESS NOTES
"[facility] Progress Note     IMPRESSION:  Chronic idiopathic pancreatitis/pancreatic insufficiency on enzyme replacement  De mallory ulcerative colitis, path pending, day #4 of IV steroids  Protein malnutrition/sarcopenia  Normocytic anemia, multifactorial including blood loss anemia    RECOMMENDATIONS:  -Awaiting pathology results from colonoscopy (need to ensure superimposed CMV is not an issue)  -Stop oral iron as this worsens inflammatory bowel disease  -Additional IV iron potentially in the outpatient setting  -Chest x-ray today to further characterize nodules on abdominal CT.  QuantiFERON was indeterminate due to steroids.  Paucity of risk factors for tuberculosis in this patient, would not delay Remicade based on nodules on abdominal CT, and indeterminant QuantiFERON alone.    Grant Perera,    [facility]  Cell [phone]    ________________________________________________________________________      SUBJECTIVE:  Feeling somewhat improved today - improvement in bleeding overnight.  4-5 stools overnight, improving consistency.      OBJECTIVE:  BP 97/53 (BP Location: Left arm)   Pulse 98   Temp 98.3  F (36.8  C) (Oral)   Resp 16   Ht 1.727 m (5' 8\")   Wt 51.3 kg (113 lb)   SpO2 99%   BMI 17.18 kg/m    Temp (24hrs), Av.1  F (36.7  C), Min:98  F (36.7  C), Max:98.3  F (36.8  C)     PHYSICAL EXAM  GEN: Alert, oriented x3, communicative and in NAD.    ABD: Minimally TTP throughout today.     Additional Data:  I have reviewed the patient's new clinical lab results:     Recent Labs   Lab Test 20  1411 20  0929 20  0913 20  1640 20  1640   WBC 10.1 12.7* 13.8*   < > 12.8*   HGB 9.8* 9.2* 9.3*   < > 3.1*   MCV 84 80 78   < > 67*   * 465* 454*   < > 622*   INR  --   --   --   --  1.10    < > = values in this interval not displayed.     Recent Labs   Lab Test 20  0913 20  0750 20  1640   POTASSIUM 4.4 4.1 3.9   CHLORIDE 102 104 " 102   CO2 26 26 26   BUN 9 11 17   ANIONGAP 8 3 6     Recent Labs   Lab Test 12/09/20  0750 12/08/20  1640 05/24/16  0815 05/18/16  1658   ALBUMIN 1.8* 1.8*  --  4.3   BILITOTAL 0.4 0.1*  --  0.8   ALT 12 16  --  21   AST 13 12  --  17   LIPASE  --  29* 956* 1,816*   AMYLASE  --   --   --  73

## 2020-12-14 NOTE — PLAN OF CARE
Pt A&O x 4, able to make needs known, up independently in room. Pt reported mild abdominal discomfort which was relieved post BM. Pt reports decrease in frequency of loose stools with 3 stools without blood. L and R PIV SL. Pt continues to have pink blanchable areas to mid spine, has improved with encouragement to reposition, declines dressing to the area. Pt requested to have all AM meds prior to breakfast.

## 2020-12-14 NOTE — PROGRESS NOTES
"SP[facility]ITUAL HEALTH [facility] Progress Note  [facility]    Brief visit with pt, per his lengthy hospital stay.  Provided general information regarding spiritual health support.  Pt said that he has no SH needs, stating, \"I'm not Islam.\"  Chaplains available per request.                                                                                                                                                 Luz Kwong M.A.  Staff   Pager [phone]  Phone [phone]      "

## 2020-12-15 LAB
ERYTHROCYTE [DISTWIDTH] IN BLOOD BY AUTOMATED COUNT: 22.1 % (ref 10–15)
HCT VFR BLD AUTO: 32.3 % (ref 40–53)
HGB BLD-MCNC: 10.1 G/DL (ref 13.3–17.7)
MCH RBC QN AUTO: 26 PG (ref 26.5–33)
MCHC RBC AUTO-ENTMCNC: 31.3 G/DL (ref 31.5–36.5)
MCV RBC AUTO: 83 FL (ref 78–100)
PLATELET # BLD AUTO: 443 10E9/L (ref 150–450)
RBC # BLD AUTO: 3.88 10E12/L (ref 4.4–5.9)
WBC # BLD AUTO: 13.8 10E9/L (ref 4–11)

## 2020-12-15 PROCEDURE — 36415 COLL VENOUS BLD VENIPUNCTURE: CPT | Performed by: HOSPITALIST

## 2020-12-15 PROCEDURE — 250N000011 HC RX IP 250 OP 636: Performed by: INTERNAL MEDICINE

## 2020-12-15 PROCEDURE — 99231 SBSQ HOSP IP/OBS SF/LOW 25: CPT | Performed by: HOSPITALIST

## 2020-12-15 PROCEDURE — 85027 COMPLETE CBC AUTOMATED: CPT | Performed by: HOSPITALIST

## 2020-12-15 PROCEDURE — 250N000013 HC RX MED GY IP 250 OP 250 PS 637: Performed by: STUDENT IN AN ORGANIZED HEALTH CARE EDUCATION/TRAINING PROGRAM

## 2020-12-15 PROCEDURE — 120N000001 HC R&B MED SURG/OB

## 2020-12-15 PROCEDURE — 250N000013 HC RX MED GY IP 250 OP 250 PS 637: Performed by: INTERNAL MEDICINE

## 2020-12-15 RX ADMIN — MULTIPLE VITAMINS W/ MINERALS TAB 1 TABLET: TAB at 07:47

## 2020-12-15 RX ADMIN — PANCRELIPASE 24000 UNITS: 30000; 6000; 19000 CAPSULE, DELAYED RELEASE PELLETS ORAL at 10:22

## 2020-12-15 RX ADMIN — METHYLPREDNISOLONE SODIUM SUCCINATE 30 MG: 40 INJECTION, POWDER, FOR SOLUTION INTRAMUSCULAR; INTRAVENOUS at 05:39

## 2020-12-15 RX ADMIN — PANCRELIPASE 24000 UNITS: 30000; 6000; 19000 CAPSULE, DELAYED RELEASE PELLETS ORAL at 07:46

## 2020-12-15 RX ADMIN — METHYLPREDNISOLONE SODIUM SUCCINATE 30 MG: 40 INJECTION, POWDER, FOR SOLUTION INTRAMUSCULAR; INTRAVENOUS at 17:35

## 2020-12-15 RX ADMIN — Medication 50 MCG: at 07:47

## 2020-12-15 RX ADMIN — PANCRELIPASE 24000 UNITS: 30000; 6000; 19000 CAPSULE, DELAYED RELEASE PELLETS ORAL at 15:19

## 2020-12-15 RX ADMIN — PANTOPRAZOLE SODIUM 40 MG: 40 TABLET, DELAYED RELEASE ORAL at 07:47

## 2020-12-15 ASSESSMENT — ACTIVITIES OF DAILY LIVING (ADL)
ADLS_ACUITY_SCORE: 16

## 2020-12-15 NOTE — PLAN OF CARE
A/ox4. VSS on RA, soft Bp's. Up Ind. Denies dizziness, lightheadedness. Denies pain or nausea. 2 loose/soft/red-streaked  stools overnight, patient helps keep track. L PIV SL, R removed. Spine pink, encouragement repositioned. Discharge pending Hgb stability, decrease in bloody stools, biopsy results.

## 2020-12-15 NOTE — PROGRESS NOTES
[facility]    Medicine Progress Note - Hospitalist Service       Date of Admission:  12/8/2020  Assessment & Plan                Profound microcytic anemia, likely Fe deficiency  Acute blood loss anemia  Possible ulcerative colitis with acute flareup  Numerous bowel movements since October accompanied by 8 lbs weight loss  He did not notice bleeding  likely slow chronic blood loss anemia however will order LDH and haptoglobin to r/o lysis  CT imaging with diffuse colonic wall thickening suggestive of colitis  typed for 2 units of blood in the ED, which the nursing reports transfused without difficulty overnight  Small amount of blood striking in his stools  [facility] consulted  Patient went underwent a EGD which showed LA grade a reflux esophagitis.  Recommended Protonix daily for 1 week as per GI  Colonoscopy findings consistent with possible ulcerative colitis  Started on methylprednisolone 30 mg IV twice daily as per [facility] to help with ulcerative colitis  hgb improving  Patient was still bleeding with multiple BMs today   IV iron transfusion 2 doses ordered and given.   Plan  - no iron per GI, worsens IBD  - continues on steroids, transitioning to orals  - remicade versus mesalamine per GI    Quantiferon is indeterminate, possibly from steroids  - CXR WNL    Pancreatitis insufficency  [facility] may have more information but patient reports episode of pancreatitis a few years ago (he was told it was pancreatitis) with resulting pancreatitic insufficiency  -he denies any known etiology for his pancreatitis, ? CF  -atrophic pancreatitis on imaging  -continue PTA Creon     Hypoalbuminemia  Severe malnutrition   Albumin of 1.8  possibly related to malnutrition and/or chronic diarrhea  Plan  -consulted nutrition and following      Pulmonary nodules  -unclear etiology  -will need 3 month follow up CT, defer to PCP         Diet: Low Fiber Diet    DVT Prophylaxis: Pneumatic Compression  Devices  Alexander Catheter: not present  Code Status: Full Code           Disposition Plan   Expected discharge: 2 - 3 days, recommended to prior living arrangement once hemoglobin stable and diarrhea and bleeding improves.  Entered: Esteban Quigley DO 12/15/2020, 4:24 PM       The patient's care was discussed with the Patient.    Teresa Downey MD   Page [phone](7AM-6PM)      ______________________________________________________________________    Interval History   Patient is having improving stools, decreasing frequency and less bloody    Data reviewed today: I reviewed all medications, new labs and imaging results over the last 24 hours. I personally reviewed   None    Physical Exam   Vital Signs: Temp: 97  F (36.1  C) Temp src: Oral BP: 109/69 Pulse: 94   Resp: 16 SpO2: 97 % O2 Device: None (Room air)    Weight: 113 lbs 0 oz  Constitutional: Awake, alert, cooperative,  thin and weak appearing   Eyes: Conjunctiva and pupils examined and normal.  Respiratory: Clear to auscultation bilaterally, no crackles or wheezing.  Cardiovascular normal rate  regular rhythm, normal S1 and S2, and no murmur noted.  GI: Soft, non-distended, non-tender, normal bowel sounds.  Lymph/Hematologic: No anterior cervical or supraclavicular adenopathy.  Skin: No rashes, no cyanosis, no edema.  Musculoskeletal: No joint swelling, erythema or tenderness.  Neurologic: Cranial nerves 2-12 intact, normal strength and sensation.  Psychiatric: Alert, oriented to person, place and time       Data   Recent Labs   Lab 12/15/20  0737 12/14/20  1411 12/12/20  0929 12/11/20  0913 12/09/20  0750 12/09/20  0750 12/08/20  1640   WBC 13.8* 10.1 12.7* 13.8*  --  8.3 12.8*   HGB 10.1* 9.8* 9.2* 9.3*   < > 4.5* 3.1*   MCV 83 84 80 78  --  74* 67*    487* 465* 454*  --  399 622*   INR  --   --   --   --   --   --  1.10   NA  --   --   --  136  --  133 134   POTASSIUM  --   --   --  4.4  --  4.1 3.9   CHLORIDE  --   --   --  102  --  104 102    CO2  --   --   --  26  --  26 26   BUN  --   --   --  9  --  11 17   CR  --   --   --  0.95  --  0.93 1.05   ANIONGAP  --   --   --  8  --  3 6   KIARA  --   --   --  8.6  --  7.8* 8.4*   GLC  --   --   --  104*  --  91 118*   ALBUMIN  --   --   --   --   --  1.8* 1.8*   PROTTOTAL  --   --   --   --   --  5.6* 6.9   BILITOTAL  --   --   --   --   --  0.4 0.1*   ALKPHOS  --   --   --   --   --  81 106   ALT  --   --   --   --   --  12 16   AST  --   --   --   --   --  13 12   LIPASE  --   --   --   --   --   --  29*    < > = values in this interval not displayed.     Recent Results (from the past 24 hour(s))   XR Chest 2 Views    Narrative    XR CHEST 2 VW 12/14/2020 6:21 PM    HISTORY: pre-Remicade Chest Xray, indeterminate TB testing    COMPARISON: None.      Impression    IMPRESSION:Clear lungs without pleural effusion or pneumothorax. The  cardiac and mediastinal silhouettes are normal. No radiographic  evidence for active pulmonary tuberculosis.    MINDA MORRIS MD     Medications       amylase-lipase-protease  4 capsule Oral TID w/meals     methylPREDNISolone  30 mg Intravenous Q12H     multivitamin w/minerals  1 tablet Oral Daily     pantoprazole  40 mg Oral QAM AC     sodium chloride (PF)  3 mL Intracatheter Q8H     vitamin D3  50 mcg Oral Daily

## 2020-12-15 NOTE — PLAN OF CARE
Pt A&O x 4, able to make needs known, up independently in room. VSS. Tele:  SR. Denies pain. Pt reports having 6 soft stools with minimal streaks of blood. L and R PIV SL. Pt continues to have pink blanchable areas to mid spine, has improved with encouragement to reposition, declines dressing to the area. CXR this evening per GI--results pending. Plan to discharge home pending biopsies results and clinical improvement.

## 2020-12-15 NOTE — PROGRESS NOTES
"[facility] Progress Note     IMPRESSION:  Chronic idiopathic pancreatitis/pancreatic insufficiency on enzyme replacement  De mallory, severe ulcerative colitis, path pending, day #5 of IV steroids - 1 year risk of colectomy ~30%  Protein malnutrition/sarcopenia  Normocytic anemia, multifactorial including blood loss anemia  Indeterminate QuantiFERON due to steroids, negative chest x-ray    Since  the patient and I have been planning Remicade therapy earlier this week.  Fortunately, however, he has had a response to IV steroids.  It would be helpful to know if he can transition to oral prednisone prior to discharge for at least 24 to 48 hours given the high likelihood of returning to the hospital with ongoing colitis.  We discussed potentially transitioning to oral prednisone tomorrow, and if this is going well he could then be placed on mesalamine.  There is a small minority of patients who have worsening of diarrhea in the setting of mesalamine therapy, but he would like to proceed with this plan rather than starting Remicade today.    RECOMMENDATIONS:  Continue IV steroids, last dose tonight, transition to 40 mg of oral prednisone tomorrow morning  Continue current diet  Will consider adding mesalamine if po steroids goes well tomorrow    Grant Perera,    [facility] - [facility]  Cell [phone]    ________________________________________________________________________      SUBJECTIVE:  Continued improvement today.  Overnight total of 9 stools, 6 of which did not contain any blood.  Overall improved stool output yesterday as well.  Improved abdominal pain.  Tolerating oral intake.  Plan discussed in great detail to which she is amenable.     OBJECTIVE:  /58 (BP Location: Left arm)   Pulse 93   Temp 98.1  F (36.7  C) (Oral)   Resp 16   Ht 1.727 m (5' 8\")   Wt 51.3 kg (113 lb)   SpO2 98%   BMI 17.18 kg/m    Temp (24hrs), Av.8  F (36.6  C), Min:97.1  F (36.2  C), Max:98.2  F (36.8 "  C)    PHYSICAL EXAM  GEN: Alert, oriented x3, communicative and in NAD.    ABD: ND, +BS, no guarding or pain to palpation, no rebound, no HSM.    Additional Data:  I have reviewed the patient's new clinical lab results:     Recent Labs   Lab Test 12/15/20  0737 12/14/20  1411 12/12/20  0929 12/08/20  1640 12/08/20  1640   WBC 13.8* 10.1 12.7*   < > 12.8*   HGB 10.1* 9.8* 9.2*   < > 3.1*   MCV 83 84 80   < > 67*    487* 465*   < > 622*   INR  --   --   --   --  1.10    < > = values in this interval not displayed.     Recent Labs   Lab Test 12/11/20  0913 12/09/20  0750 12/08/20  1640   POTASSIUM 4.4 4.1 3.9   CHLORIDE 102 104 102   CO2 26 26 26   BUN 9 11 17   ANIONGAP 8 3 6     Recent Labs   Lab Test 12/09/20  0750 12/08/20  1640 05/24/16  0815 05/18/16  1658   ALBUMIN 1.8* 1.8*  --  4.3   BILITOTAL 0.4 0.1*  --  0.8   ALT 12 16  --  21   AST 13 12  --  17   LIPASE  --  29* 956* 1,816*   AMYLASE  --   --   --  73

## 2020-12-16 PROCEDURE — 250N000013 HC RX MED GY IP 250 OP 250 PS 637: Performed by: INTERNAL MEDICINE

## 2020-12-16 PROCEDURE — 250N000012 HC RX MED GY IP 250 OP 636 PS 637: Performed by: INTERNAL MEDICINE

## 2020-12-16 PROCEDURE — 120N000001 HC R&B MED SURG/OB

## 2020-12-16 PROCEDURE — 99231 SBSQ HOSP IP/OBS SF/LOW 25: CPT | Performed by: HOSPITALIST

## 2020-12-16 PROCEDURE — 250N000013 HC RX MED GY IP 250 OP 250 PS 637: Performed by: STUDENT IN AN ORGANIZED HEALTH CARE EDUCATION/TRAINING PROGRAM

## 2020-12-16 RX ORDER — PREDNISONE 20 MG/1
40 TABLET ORAL DAILY
Status: DISCONTINUED | OUTPATIENT
Start: 2020-12-16 | End: 2020-12-18 | Stop reason: HOSPADM

## 2020-12-16 RX ORDER — MESALAMINE 4 G/60ML
4 SUSPENSION RECTAL AT BEDTIME
Status: DISCONTINUED | OUTPATIENT
Start: 2020-12-16 | End: 2020-12-17

## 2020-12-16 RX ADMIN — MESALAMINE 4 G: 4 ENEMA RECTAL at 22:36

## 2020-12-16 RX ADMIN — Medication 50 MCG: at 08:50

## 2020-12-16 RX ADMIN — MULTIPLE VITAMINS W/ MINERALS TAB 1 TABLET: TAB at 08:50

## 2020-12-16 RX ADMIN — PANCRELIPASE 24000 UNITS: 30000; 6000; 19000 CAPSULE, DELAYED RELEASE PELLETS ORAL at 16:44

## 2020-12-16 RX ADMIN — PANCRELIPASE 24000 UNITS: 30000; 6000; 19000 CAPSULE, DELAYED RELEASE PELLETS ORAL at 07:13

## 2020-12-16 RX ADMIN — PREDNISONE 40 MG: 20 TABLET ORAL at 09:32

## 2020-12-16 RX ADMIN — PANTOPRAZOLE SODIUM 40 MG: 40 TABLET, DELAYED RELEASE ORAL at 07:13

## 2020-12-16 RX ADMIN — PANCRELIPASE 24000 UNITS: 30000; 6000; 19000 CAPSULE, DELAYED RELEASE PELLETS ORAL at 11:15

## 2020-12-16 ASSESSMENT — ACTIVITIES OF DAILY LIVING (ADL)
ADLS_ACUITY_SCORE: 16

## 2020-12-16 NOTE — PLAN OF CARE
A & O x 4, VSS, no c/o pain. Up ad leda, no c/o pain. No red stools overnight and decreasing in frequency. Pt did not want 0500 IV solumedrol because he's transitioning to po prednisone today to see if effective w/managing stools. Possible discharge home on Friday on mesalamine.

## 2020-12-16 NOTE — PROGRESS NOTES
GI    20mins spent with the patient today discussing symptoms and plan, to which he is amenable.     Continued loose stools, decreasing blood, improved abd pain, tolerating diet.     Trial of po steroids today given apparent, though rather slow improvement with IV steroids (total 5days IV).  Discussed plan for prednisone taper, and suggested we add topical mesalamine tonight, perhaps po mesalamine tomorrow.       Imp/recs:  New dx of UC, severe  Panc exocrine insuff, on enzyme replacement    Suggest checking WBC tomorrow AM given ongoing leukocytosis - at risk for C diff/CMV  Will start pr mesalamine @ hs, po steroids started this AM  Continue diet  Ambulation encouraged    May be nearing discharge if he does well with po steroids, but will need an exit strategy from steroids, ie plan for mesalamine above.     Grant Perera DO   [facility] - [facility]  Cell [phone]

## 2020-12-16 NOTE — PROGRESS NOTES
[facility]    Medicine Progress Note - Hospitalist Service       Date of Admission:  12/8/2020  Assessment & Plan                Profound microcytic anemia, likely Fe deficiency  Acute blood loss anemia  Possible ulcerative colitis with acute flareup  Numerous bowel movements since October accompanied by 8 lbs weight loss  He did not notice bleeding  likely slow chronic blood loss anemia however will order LDH and haptoglobin to r/o lysis  CT imaging with diffuse colonic wall thickening suggestive of colitis  typed for 2 units of blood in the ED, which the nursing reports transfused without difficulty overnight  Small amount of blood striking in his stools  [facility] consulted  Patient went underwent a EGD which showed LA grade a reflux esophagitis.  Recommended Protonix daily for 1 week as per GI  Colonoscopy findings consistent with possible ulcerative colitis  Started on methylprednisolone 30 mg IV twice daily as per [facility] to help with ulcerative colitis  hgb improving  Patient was still bleeding with multiple BMs today   IV iron transfusion 2 doses ordered and given.   Plan  - no iron per GI, worsens IBD  - continues on steroids, transitioning to orals  - remicade versus mesalamine per GI  - repeat CBC in the AM    Quantiferon is indeterminate, possibly from steroids  - CXR WNL    Pancreatitis insufficency  [facility] may have more information but patient reports episode of pancreatitis a few years ago (he was told it was pancreatitis) with resulting pancreatitic insufficiency  -he denies any known etiology for his pancreatitis, ? CF  -atrophic pancreatitis on imaging  -continue PTA Creon     Hypoalbuminemia  Severe malnutrition   Albumin of 1.8  possibly related to malnutrition and/or chronic diarrhea  Plan  -consulted nutrition and following      Pulmonary nodules  -unclear etiology  -will need 3 month follow up CT, defer to PCP         Diet: Low Fiber Diet    DVT Prophylaxis: Pneumatic  Compression Devices  Alexander Catheter: not present  Code Status: Full Code           Disposition Plan   Expected discharge: 2 - 3 days, recommended to prior living arrangement once hemoglobin stable and diarrhea and bleeding improves, cleared by GI  Entered: Esteban Quigley DO 12/16/2020, 12:27 PM       The patient's care was discussed with the Patient.    Teresa Downey MD   Page [phone](7AM-6PM)      ______________________________________________________________________    Interval History   Patient is having improving stools, decreasing frequency and less bloody. Transitioning to oral steroids today    Data reviewed today: I reviewed all medications, new labs and imaging results over the last 24 hours. I personally reviewed   None    Physical Exam   Vital Signs: Temp: (P) 98.4  F (36.9  C) Temp src: (P) Oral BP: (P) 105/55 Pulse: (P) 88   Resp: (P) 18 SpO2: (P) 97 % O2 Device: (P) None (Room air)    Weight: 113 lbs 0 oz  Constitutional: Awake, alert, cooperative,  thin and weak appearing   Eyes: Conjunctiva and pupils examined and normal.  Respiratory: Clear to auscultation bilaterally, no crackles or wheezing.  Cardiovascular normal rate  regular rhythm, normal S1 and S2, and no murmur noted.  GI: Soft, non-distended, non-tender, normal bowel sounds.  Lymph/Hematologic: No anterior cervical or supraclavicular adenopathy.  Skin: No rashes, no cyanosis, no edema.  Musculoskeletal: No joint swelling, erythema or tenderness.  Neurologic: Cranial nerves 2-12 intact, normal strength and sensation.  Psychiatric: Alert, oriented to person, place and time       Data   Recent Labs   Lab 12/15/20  0737 12/14/20  1411 12/12/20  0929 12/11/20  0913   WBC 13.8* 10.1 12.7* 13.8*   HGB 10.1* 9.8* 9.2* 9.3*   MCV 83 84 80 78    487* 465* 454*   NA  --   --   --  136   POTASSIUM  --   --   --  4.4   CHLORIDE  --   --   --  102   CO2  --   --   --  26   BUN  --   --   --  9   CR  --   --   --  0.95   ANIONGAP  --   --    --  8   KIARA  --   --   --  8.6   GLC  --   --   --  104*     No results found for this or any previous visit (from the past 24 hour(s)).  Medications       amylase-lipase-protease  4 capsule Oral TID w/meals     mesalamine  4 g Rectal At Bedtime     multivitamin w/minerals  1 tablet Oral Daily     pantoprazole  40 mg Oral QAM AC     predniSONE  40 mg Oral Daily     sodium chloride (PF)  3 mL Intracatheter Q8H     vitamin D3  50 mcg Oral Daily

## 2020-12-16 NOTE — PLAN OF CARE
VSS on RA. Denied pain. Tele SR- discontinued tele today. Tolerating diet. Red tinted loose stools x4 today. Up independently. Plan to transition to 40 mg of oral prednisone tomorrow morning then monitor diarrhea for a couple days and possibly discharge home on mesalamine on Thurs/Friday per GI.

## 2020-12-17 LAB
ERYTHROCYTE [DISTWIDTH] IN BLOOD BY AUTOMATED COUNT: 20.9 % (ref 10–15)
HCT VFR BLD AUTO: 29.1 % (ref 40–53)
HGB BLD-MCNC: 8.9 G/DL (ref 13.3–17.7)
MCH RBC QN AUTO: 25.4 PG (ref 26.5–33)
MCHC RBC AUTO-ENTMCNC: 30.6 G/DL (ref 31.5–36.5)
MCV RBC AUTO: 83 FL (ref 78–100)
PLATELET # BLD AUTO: 358 10E9/L (ref 150–450)
RBC # BLD AUTO: 3.5 10E12/L (ref 4.4–5.9)
WBC # BLD AUTO: 9.4 10E9/L (ref 4–11)

## 2020-12-17 PROCEDURE — 250N000013 HC RX MED GY IP 250 OP 250 PS 637: Performed by: INTERNAL MEDICINE

## 2020-12-17 PROCEDURE — 250N000013 HC RX MED GY IP 250 OP 250 PS 637: Performed by: STUDENT IN AN ORGANIZED HEALTH CARE EDUCATION/TRAINING PROGRAM

## 2020-12-17 PROCEDURE — 85027 COMPLETE CBC AUTOMATED: CPT | Performed by: HOSPITALIST

## 2020-12-17 PROCEDURE — 99232 SBSQ HOSP IP/OBS MODERATE 35: CPT | Performed by: HOSPITALIST

## 2020-12-17 PROCEDURE — 250N000012 HC RX MED GY IP 250 OP 636 PS 637: Performed by: INTERNAL MEDICINE

## 2020-12-17 PROCEDURE — 36415 COLL VENOUS BLD VENIPUNCTURE: CPT | Performed by: HOSPITALIST

## 2020-12-17 PROCEDURE — 120N000001 HC R&B MED SURG/OB

## 2020-12-17 RX ORDER — MESALAMINE 1.2 G/1
4.8 TABLET, DELAYED RELEASE ORAL
Status: DISCONTINUED | OUTPATIENT
Start: 2020-12-17 | End: 2020-12-18 | Stop reason: HOSPADM

## 2020-12-17 RX ADMIN — PANCRELIPASE 24000 UNITS: 30000; 6000; 19000 CAPSULE, DELAYED RELEASE PELLETS ORAL at 17:22

## 2020-12-17 RX ADMIN — PANCRELIPASE 24000 UNITS: 30000; 6000; 19000 CAPSULE, DELAYED RELEASE PELLETS ORAL at 12:18

## 2020-12-17 RX ADMIN — MULTIPLE VITAMINS W/ MINERALS TAB 1 TABLET: TAB at 08:40

## 2020-12-17 RX ADMIN — PREDNISONE 40 MG: 20 TABLET ORAL at 08:40

## 2020-12-17 RX ADMIN — PANCRELIPASE 24000 UNITS: 30000; 6000; 19000 CAPSULE, DELAYED RELEASE PELLETS ORAL at 06:56

## 2020-12-17 RX ADMIN — MESALAMINE 4.8 G: 1.2 TABLET, DELAYED RELEASE ORAL at 14:03

## 2020-12-17 RX ADMIN — Medication 50 MCG: at 08:40

## 2020-12-17 RX ADMIN — PANTOPRAZOLE SODIUM 40 MG: 40 TABLET, DELAYED RELEASE ORAL at 06:57

## 2020-12-17 ASSESSMENT — ACTIVITIES OF DAILY LIVING (ADL)
ADLS_ACUITY_SCORE: 16

## 2020-12-17 NOTE — PLAN OF CARE
A & O X 4. No c/o pain. Up ad leda. Transitioned to po prednisone today, stool frequency improving. Started mesalamine enema. PIV SL. Tolerating low fiber diet. Possible discharge Friday.

## 2020-12-17 NOTE — PROGRESS NOTES
CLINICAL NUTRITION SERVICES - REASSESSMENT NOTE      Malnutrition: (12/9)  % Weight Loss:  > 7.5% in 3 months (severe malnutrition)  % Intake:  </= 75% for >/= 1 month (severe malnutrition) - intake has fluctuated past few months  Subcutaneous Fat Loss:  None observed  Muscle Loss:  Clavicle bone region - moderate depletion, Patellar region - moderate depletion, Anterior thigh region - moderate depletion and Posterior calf region - moderate depletion  Fluid Retention:  None noted     Malnutrition Diagnosis: Severe malnutrition  In Context of:  Acute illness or injury        EVALUATION OF PROGRESS TOWARD GOALS   Diet:  Low Fiber     Intake/Tolerance:  Chart reviewed, attempted to reach patient via phone but line was busy.  Noted per flowsheets that he has been consistently consuming % of meals.  Lunch today was a chicken sandwich, green beans, and a Rice Krispie Bar.  Breakfast this morning was pancake x 2, hester x 4, muffin x 2, and soy milk.  Patient is consistently ordering 3 meals per day.      NEW FINDINGS:   Noted BM x 6 yesterday and x 1 today   Plan for discharge home tomorrow     No new weight since admit     Previous Goals (12/14):   Pt to consume >75% of 3 meals per day  Evaluation: Met    Previous Nutrition Diagnosis (12/14):   Unintended weight loss related to fluctuating po intake and increased diarrhea  as evidenced by unintentional wt loss of 15% past few months  Evaluation: No change (unable to fully evaluate without an updated weight)      CURRENT NUTRITION DIAGNOSIS  Unintended weight loss related to fluctuating po intake and increased diarrhea  as evidenced by unintentional wt loss of 15% past few months    INTERVENTIONS  Recommendations / Nutrition Prescription  Continue Low Fiber diet as tolerated     Recommend re-weigh patient as he has not been weighed since admit     Implementation  None     Goals  Patient will continue to order 3 meals per day and consume at least %    MONITORING  AND EVALUATION:  Progress towards goals will be monitored and evaluated per protocol and Practice Guidelines    Lelo Page RD, LD, CNSC   Clinical Dietitian - [facility]

## 2020-12-17 NOTE — PROGRESS NOTES
[facility]    Medicine Progress Note - Hospitalist Service       Date of Admission:  12/8/2020  Assessment & Plan         Profound microcytic anemia, likely Fe deficiency  Acute blood loss anemia  Possible ulcerative colitis with acute flareup  Numerous bowel movements since October accompanied by 8 lbs weight loss  He did not notice bleeding  likely slow chronic blood loss anemia however will order LDH and haptoglobin to r/o lysis  CT imaging with diffuse colonic wall thickening suggestive of colitis  typed for 2 units of blood in the ED, which the nursing reports transfused without difficulty overnight  Small amount of blood striking in his stools  [facility] consulted  Patient went underwent a EGD which showed LA grade a reflux esophagitis.  Recommended Protonix daily for 1 week as per GI  Colonoscopy findings consistent with possible ulcerative colitis  Started on methylprednisolone 30 mg IV twice daily as per [facility] to help with ulcerative colitis  hgb improving  Patient was still bleeding with multiple BMs today   IV iron transfusion 2 doses ordered and given.   Plan  - no iron per GI, worsens IBD  - oral steroids, oral mesalamine  - repeat CBC in the AM    Quantiferon is indeterminate, possibly from steroids  - CXR WNL    Pancreatitis insufficency  [facility] may have more information but patient reports episode of pancreatitis a few years ago (he was told it was pancreatitis) with resulting pancreatitic insufficiency  -he denies any known etiology for his pancreatitis, ? CF  -atrophic pancreatitis on imaging  -continue PTA Creon     Hypoalbuminemia  Severe malnutrition   Albumin of 1.8  possibly related to malnutrition and/or chronic diarrhea  Plan  -consulted nutrition and following      Pulmonary nodules  -unclear etiology  -will need 3 month follow up CT, defer to PCP         Diet: Low Fiber Diet    DVT Prophylaxis: Pneumatic Compression Devices  Alexander Catheter: not present  Code  Status: Full Code           Disposition Plan   Expected discharge: 2 - 3 days, recommended to prior living arrangement once hemoglobin stable and diarrhea and bleeding improves, cleared by GI  Entered: Esteban Quigley DO 12/17/2020, 2:23 PM       The patient's care was discussed with the Patient.    Teresa Downey MD   Page [phone](7AM-6PM)      ______________________________________________________________________    Interval History   Did poorly with the mesalamine enema.    Data reviewed today: I reviewed all medications, new labs and imaging results over the last 24 hours. I personally reviewed   None    Physical Exam   Vital Signs: Temp: 98.6  F (37  C) Temp src: Oral BP: 107/55 Pulse: 76   Resp: 16 SpO2: 100 % O2 Device: None (Room air)    Weight: 113 lbs 0 oz  Constitutional: Awake, alert, cooperative,  thin and weak appearing   Eyes: Conjunctiva and pupils examined and normal.  Respiratory: Clear to auscultation bilaterally, no crackles or wheezing.  Cardiovascular normal rate  regular rhythm, normal S1 and S2, and no murmur noted.  GI: Soft, non-distended, non-tender, normal bowel sounds.  Lymph/Hematologic: No anterior cervical or supraclavicular adenopathy.  Skin: No rashes, no cyanosis, no edema.  Musculoskeletal: No joint swelling, erythema or tenderness.  Neurologic: Cranial nerves 2-12 intact, normal strength and sensation.  Psychiatric: Alert, oriented to person, place and time       Data   Recent Labs   Lab 12/17/20  0822 12/15/20  0737 12/14/20  1411 12/11/20  0913 12/11/20  0913   WBC 9.4 13.8* 10.1   < > 13.8*   HGB 8.9* 10.1* 9.8*   < > 9.3*   MCV 83 83 84   < > 78    443 487*   < > 454*   NA  --   --   --   --  136   POTASSIUM  --   --   --   --  4.4   CHLORIDE  --   --   --   --  102   CO2  --   --   --   --  26   BUN  --   --   --   --  9   CR  --   --   --   --  0.95   ANIONGAP  --   --   --   --  8   KIARA  --   --   --   --  8.6   GLC  --   --   --   --  104*    < > = values  in this interval not displayed.     No results found for this or any previous visit (from the past 24 hour(s)).  Medications       amylase-lipase-protease  4 capsule Oral TID w/meals     mesalamine  4.8 g Oral Daily with breakfast     multivitamin w/minerals  1 tablet Oral Daily     pantoprazole  40 mg Oral QAM AC     predniSONE  40 mg Oral Daily     sodium chloride (PF)  3 mL Intracatheter Q8H     vitamin D3  50 mcg Oral Daily

## 2020-12-17 NOTE — PROGRESS NOTES
"[facility] - Digestive Health Progress Note     IMPRESSION:  De mallory UC, severe  Panc exocrine insuff    Transitioned to po prednisone after 5 days of IV steroids.  Did not tolerate 5ASA enema, but willing to start po today.     RECOMMENDATIONS:  Continue prednisone (day #2 po steroids)  Stop mesalamine enema - remains an option to try as outpt   Start po mesalamine, if intolerant to this, will start Remicade as we have exhausted alternative options     Grant Perera, DO   [facility] - Digestive [facility]  Cell [phone]    ________________________________________________________________________      SUBJECTIVE:  2 stools overnight, still seeing decreasing bleeding with stools overall.  Leukocytosis improved - reviewed with pt.  Feeling better late AM - rough AM after first mesalamine enema last night.  Have about 30-40min BM around 2am with cramping and bleeding, to which the patient attributed to enema, does not want to continue.  Discussed with trial of po mesalamine today, continue po pred, and if not well tolerated, proceed to Remicade.      OBJECTIVE:  /55 (BP Location: Right arm)   Pulse 76   Temp 98.6  F (37  C) (Oral)   Resp 16   Ht 1.727 m (5' 8\")   Wt 51.3 kg (113 lb)   SpO2 100%   BMI 17.18 kg/m    Temp (24hrs), Av.6  F (37  C), Min:98.3  F (36.8  C), Max:98.8  F (37.1  C)    No data found.  No intake or output data in the 24 hours ending 20 1119     PHYSICAL EXAM  GEN: Alert, oriented x3, communicative and in NAD.    ABD: ND, +BS, min TTP.    Additional Data:  I have reviewed the patient's new clinical lab results:     Recent Labs   Lab Test 20  0822 12/15/20  0737 20  1411 20  1640 20  1640   WBC 9.4 13.8* 10.1   < > 12.8*   HGB 8.9* 10.1* 9.8*   < > 3.1*   MCV 83 83 84   < > 67*    443 487*   < > 622*   INR  --   --   --   --  1.10    < > = values in this interval not displayed.     Recent Labs   Lab Test 20  0913 20  0750 20  1640   POTASSIUM " 4.4 4.1 3.9   CHLORIDE 102 104 102   CO2 26 26 26   BUN 9 11 17   ANIONGAP 8 3 6     Recent Labs   Lab Test 12/09/20  0750 12/08/20  1640 05/24/16  0815 05/18/16  1658   ALBUMIN 1.8* 1.8*  --  4.3   BILITOTAL 0.4 0.1*  --  0.8   ALT 12 16  --  21   AST 13 12  --  17   LIPASE  --  29* 956* 1,816*   AMYLASE  --   --   --  73

## 2020-12-17 NOTE — PROGRESS NOTES
A&O. VSS on RA. Up IND. Low fiber diet. Enema given on previous shift, held for 3 hours. 2 loose stool this shift, scant amount of blood. No c/o pain. PIV SL. Possible discharge home on 12/18.

## 2020-12-17 NOTE — PLAN OF CARE
VSS on RA. Denied pain. Only 2 stools in 12 hours today. Started prednisone. Will get topical mesalamine tonight and possibly start PO tomorrow. Up independently. Possible discharge Friday.

## 2020-12-18 VITALS
HEART RATE: 98 BPM | RESPIRATION RATE: 16 BRPM | SYSTOLIC BLOOD PRESSURE: 109 MMHG | OXYGEN SATURATION: 98 % | TEMPERATURE: 98.4 F | HEIGHT: 68 IN | BODY MASS INDEX: 17.13 KG/M2 | DIASTOLIC BLOOD PRESSURE: 56 MMHG | WEIGHT: 113 LBS

## 2020-12-18 LAB
ERYTHROCYTE [DISTWIDTH] IN BLOOD BY AUTOMATED COUNT: 20.6 % (ref 10–15)
HCT VFR BLD AUTO: 31 % (ref 40–53)
HGB BLD-MCNC: 9.6 G/DL (ref 13.3–17.7)
MCH RBC QN AUTO: 26.1 PG (ref 26.5–33)
MCHC RBC AUTO-ENTMCNC: 31 G/DL (ref 31.5–36.5)
MCV RBC AUTO: 84 FL (ref 78–100)
PLATELET # BLD AUTO: 414 10E9/L (ref 150–450)
RBC # BLD AUTO: 3.68 10E12/L (ref 4.4–5.9)
WBC # BLD AUTO: 9.6 10E9/L (ref 4–11)

## 2020-12-18 PROCEDURE — 99239 HOSP IP/OBS DSCHRG MGMT >30: CPT | Performed by: HOSPITALIST

## 2020-12-18 PROCEDURE — 36415 COLL VENOUS BLD VENIPUNCTURE: CPT | Performed by: HOSPITALIST

## 2020-12-18 PROCEDURE — 250N000012 HC RX MED GY IP 250 OP 636 PS 637: Performed by: INTERNAL MEDICINE

## 2020-12-18 PROCEDURE — 250N000013 HC RX MED GY IP 250 OP 250 PS 637: Performed by: INTERNAL MEDICINE

## 2020-12-18 PROCEDURE — 85027 COMPLETE CBC AUTOMATED: CPT | Performed by: HOSPITALIST

## 2020-12-18 PROCEDURE — 250N000013 HC RX MED GY IP 250 OP 250 PS 637: Performed by: STUDENT IN AN ORGANIZED HEALTH CARE EDUCATION/TRAINING PROGRAM

## 2020-12-18 RX ORDER — PANTOPRAZOLE SODIUM 40 MG/1
40 TABLET, DELAYED RELEASE ORAL
Qty: 30 TABLET | Refills: 0 | Status: SHIPPED | OUTPATIENT
Start: 2020-12-18

## 2020-12-18 RX ORDER — MESALAMINE 1.2 G/1
4.8 TABLET, DELAYED RELEASE ORAL
Qty: 120 TABLET | Refills: 0 | Status: SHIPPED | OUTPATIENT
Start: 2020-12-19 | End: 2021-01-18

## 2020-12-18 RX ORDER — PREDNISONE 5 MG/1
TABLET ORAL
Qty: 168 TABLET | Refills: 0 | Status: SHIPPED | OUTPATIENT
Start: 2020-12-19 | End: 2021-01-30

## 2020-12-18 RX ADMIN — PANCRELIPASE 24000 UNITS: 30000; 6000; 19000 CAPSULE, DELAYED RELEASE PELLETS ORAL at 06:28

## 2020-12-18 RX ADMIN — PREDNISONE 40 MG: 20 TABLET ORAL at 08:16

## 2020-12-18 RX ADMIN — MULTIPLE VITAMINS W/ MINERALS TAB 1 TABLET: TAB at 08:16

## 2020-12-18 RX ADMIN — PANCRELIPASE 24000 UNITS: 30000; 6000; 19000 CAPSULE, DELAYED RELEASE PELLETS ORAL at 06:30

## 2020-12-18 RX ADMIN — Medication 50 MCG: at 08:16

## 2020-12-18 RX ADMIN — MESALAMINE 4.8 G: 1.2 TABLET, DELAYED RELEASE ORAL at 08:18

## 2020-12-18 RX ADMIN — PANCRELIPASE 24000 UNITS: 30000; 6000; 19000 CAPSULE, DELAYED RELEASE PELLETS ORAL at 06:29

## 2020-12-18 RX ADMIN — PANTOPRAZOLE SODIUM 40 MG: 40 TABLET, DELAYED RELEASE ORAL at 06:27

## 2020-12-18 ASSESSMENT — ACTIVITIES OF DAILY LIVING (ADL)
ADLS_ACUITY_SCORE: 16

## 2020-12-18 NOTE — PROGRESS NOTES
"[facility] - Digestive Health Progress Note     IMPRESSION:  New dx of UC on this admission    Summary for GI follow up :  Initially was not improving on IV steroids, but by day #5 had a response.  HBV immune, quantiferon indeterminate while on steroids, CXR neg, but rather than starting Remicade, we transitioned to po prednisone (day #3 today).   Did not tolerate topical 5ASA, but po 5ASA went well, started 12/.      RECOMMENDATIONS:  -->Please discharge with prednisone taper - 40mg for 7 days, taper down by 10mg until on 20mg/day, then taper by 5mg per week.    --?Discharge on mesalamine 1.2gm tab, 4 tabs daily    We will arrange IBD clinic follow up as well as pancreas clinic follow up with Dr. Elkins given chronic panc/exocrine insuff.    Please call with any questions.     Grant Perera, DO   [facility] Digestive [facility]  Cell [phone]    ________________________________________________________________________      SUBJECTIVE:  5-6 stools yesterday, decreasing blood, tolerated po 5asa, diet going well, overnight 1 stool.  Discussed discharge plan, to which he is amenable.      OBJECTIVE:  /56 (BP Location: Right arm)   Pulse 98   Temp 98.4  F (36.9  C) (Oral)   Resp 16   Ht 1.727 m (5' 8\")   Wt 51.3 kg (113 lb)   SpO2 98%   BMI 17.18 kg/m    Temp (24hrs), Av.5  F (36.9  C), Min:98.1  F (36.7  C), Max:98.9  F (37.2  C)    No data found.  No intake or output data in the 24 hours ending 20 1046     PHYSICAL EXAM  GEN: Alert, oriented x3, communicative and in NAD.    LYMPH: No LAD noted.  HRT: RRR  LUNGS: CTA  ABD: ND, +BS, no guarding or pain to palpation, no rebound, no HSM.  SKIN: No rash, jaundice or spider angiomata      Additional Data:  I have reviewed the patient's new clinical lab results:     Recent Labs   Lab Test 20  0810 20  0822 12/15/20  0737 20  1640 20  1640   WBC 9.6 9.4 13.8*   < > 12.8*   HGB 9.6* 8.9* 10.1*   < > 3.1*   MCV 84 83 83   < > 67*    " 358 443   < > 622*   INR  --   --   --   --  1.10    < > = values in this interval not displayed.     Recent Labs   Lab Test 12/11/20  0913 12/09/20  0750 12/08/20  1640   POTASSIUM 4.4 4.1 3.9   CHLORIDE 102 104 102   CO2 26 26 26   BUN 9 11 17   ANIONGAP 8 3 6     Recent Labs   Lab Test 12/09/20 0750 12/08/20  1640 05/24/16  0815 05/18/16  1658   ALBUMIN 1.8* 1.8*  --  4.3   BILITOTAL 0.4 0.1*  --  0.8   ALT 12 16  --  21   AST 13 12  --  17   LIPASE  --  29* 956* 1,816*   AMYLASE  --   --   --  73

## 2020-12-18 NOTE — PLAN OF CARE
Patient discharged from unit via wheelchair with belongings, meds, discharge paperwork. All questions answered at this time.

## 2020-12-18 NOTE — DISCHARGE SUMMARY
[facility]  Hospitalist Discharge Summary      Date of Admission:  12/8/2020  Date of Discharge:  12/18/2020  Discharging Provider: Esteban Quigley DO      Discharge Diagnoses   Severe blood loss anemia from new onset ulcerative colitis    Follow-ups Needed After Discharge   Follow-up Appointments     Follow-up and recommended labs and tests       Follow up with primary care provider, Physician No Ref-Primary, within 7   days to evaluate medication change.  No follow up labs or test are needed.               Unresulted Labs Ordered in the Past 30 Days of this Admission     No orders found from 11/8/2020 to 12/9/2020.      These results will be followed up by none    Discharge Disposition   Discharged to home  Condition at discharge: Stable      Hospital Course   Profound microcytic anemia, Fe deficiency  Acute blood loss anemia  ulcerative colitis with acute flareup  Numerous bowel movements since October accompanied by 8 lbs weight loss  He did not notice bleeding  likely slow chronic blood loss anemia however will order LDH and haptoglobin to r/o lysis  CT imaging with diffuse colonic wall thickening suggestive of colitis  typed for 2 units of blood in the ED, which the nursing reports transfused without difficulty overnight  Small amount of blood striking in his stools  [facility] consulted  Patient went underwent a EGD which showed LA grade a reflux esophagitis.  Recommended Protonix daily for 1 week as per GI  Colonoscopy findings consistent with possible ulcerative colitis  Started on methylprednisolone 30 mg IV twice daily as per [facility] to help with ulcerative colitis  hgb improving  Patient was still bleeding with multiple BMs today   IV iron transfusion 2 doses ordered and given.   Plan  - no iron per GI, worsens IBD  - oral steroid taper as below, oral mesalamine  - Follow-up with [facility] as an outpatient     Quantiferon is indeterminate, possibly from steroids  - CXR  WNL     Pancreatitis insufficency  [facility] may have more information but patient reports episode of pancreatitis a few years ago (he was told it was pancreatitis) with resulting pancreatitic insufficiency  -he denies any known etiology for his pancreatitis, ? CF  -atrophic pancreatitis on imaging  -continue PTA Creon     Hypoalbuminemia  Severe malnutrition   Albumin of 1.8  possibly related to malnutrition and/or chronic diarrhea  Plan  -consulted nutrition and following      Pulmonary nodules  -unclear etiology  -will need 3 month follow up CT, defer to PCP          Consultations This Hospital Stay   GASTROENTEROLOGY IP CONSULT  NUTRITION SERVICES ADULT IP CONSULT  WOUND OSTOMY CONTINENCE NURSE  IP CONSULT    Code Status   Full Code    Time Spent on this Encounter   I, Esteban Quigley DO, personally saw the patient today and spent greater than 30 minutes discharging this patient.       Esteban Quigley DO  [place] [address] ONCOLOGY  [facility] [address]  [facility] [address]  Phone: [phone]  ______________________________________________________________________    Physical Exam   Vital Signs: Temp: 98.4  F (36.9  C) Temp src: Oral BP: 109/56 Pulse: 98   Resp: 16 SpO2: 98 % O2 Device: None (Room air)    Weight: 113 lbs 0 oz  Constitutional: Awake, alert, cooperative,  thin and weak appearing   Respiratory: Clear to auscultation bilaterally, no crackles or wheezing.  Cardiovascular normal rate  regular rhythm, normal S1 and S2, and no murmur noted.  GI: Soft, non-distended, non-tender, normal bowel sounds.  Skin: No rashes, no cyanosis, no edema.            Primary Care Physician   Physician No Ref-Primary    Discharge Orders      Reason for your hospital stay    Severe anemia from uncontrolled inflammatory bowel disease (newly diagnosed ulcerative colitis)     Follow-up and recommended labs and tests     Follow up with primary care provider, Physician No Ref-Primary, within 7 days to  evaluate medication change.  No follow up labs or test are needed.     Activity    Your activity upon discharge: activity as tolerated     Diet    Follow this diet upon discharge: Orders Placed This Encounter      Low Fiber Diet       Significant Results and Procedures   Most Recent 3 CBC's:  Recent Labs   Lab Test 12/18/20  0810 12/17/20  0822 12/15/20  0737   WBC 9.6 9.4 13.8*   HGB 9.6* 8.9* 10.1*   MCV 84 83 83    358 443     Most Recent 3 BMP's:  Recent Labs   Lab Test 12/11/20  0913 12/09/20  0750 12/08/20  1640    133 134   POTASSIUM 4.4 4.1 3.9   CHLORIDE 102 104 102   CO2 26 26 26   BUN 9 11 17   CR 0.95 0.93 1.05   ANIONGAP 8 3 6   KIARA 8.6 7.8* 8.4*   * 91 118*     Most Recent 2 LFT's:  Recent Labs   Lab Test 12/09/20  0750 12/08/20  1640   AST 13 12   ALT 12 16   ALKPHOS 81 106   BILITOTAL 0.4 0.1*   ,   Results for orders placed or performed during the hospital encounter of 12/08/20   CT Abdomen Pelvis w Contrast    Narrative    CT ABDOMEN PELVIS WITH CONTRAST   12/8/2020 5:50 PM     HISTORY: Abdominal pain, acute, generalized.    TECHNIQUE:  CT abdomen and pelvis with 66mL Isovue-370 IV. Radiation  dose for this scan was reduced using automated exposure control,  adjustment of the mA and/or kV according to patient size, or iterative  reconstruction technique.    COMPARISON: None available    FINDINGS:  Lower chest: Few bibasilar nodular pulmonary opacities, could be  infectious including 8 mm left lower lobe subpleural nodule (series 4  image 22) and 1.1 cm right middle lobe subpleural nodule (series 4  image 29).    Abdomen/pelvis: No suspicious focal hepatic lesion. The gallbladder is  unremarkable. Atrophic appearance of the pancreas. No splenomegaly. No  adrenal nodules. No radiodense kidney stones or hydronephrosis in  either kidney.    No abnormally dilated bowel loops. Diffuse chronic wall thickening,  could be due to underlying colitis. No significant free fluid in  the  abdomen and pelvis. No free peritoneal or portal venous gas.    Bones and soft tissues: No suspicious osseous lesion.      Impression    IMPRESSION:    1. Although the colon is not distended, there is diffuse colonic wall  thickening, which can be seen with colitis.  2. Multiple bibasilar pulmonary nodules, of indeterminate etiology,  could be infectious or less likely neoplastic given the lack of  patient's history of malignancy. Recommend follow-up exam in 3 months  to ensure resolution.  3. Atrophic appearance of the pancreas of indeterminate etiology.    ISABEL GREEN MD   XR Chest 2 Views    Narrative    XR CHEST 2 VW 12/14/2020 6:21 PM    HISTORY: pre-Remicade Chest Xray, indeterminate TB testing    COMPARISON: None.      Impression    IMPRESSION:Clear lungs without pleural effusion or pneumothorax. The  cardiac and mediastinal silhouettes are normal. No radiographic  evidence for active pulmonary tuberculosis.    MINDA MORRIS MD       Discharge Medications   Current Discharge Medication List      START taking these medications    Details   mesalamine (LIALDA) 1.2 g EC tablet Take 4 tablets (4.8 g) by mouth daily (with breakfast)  Qty: 120 tablet, Refills: 0    Associated Diagnoses: Anemia due to blood loss, acute      pantoprazole (PROTONIX) 40 MG EC tablet Take 1 tablet (40 mg) by mouth every morning (before breakfast)  Qty: 30 tablet, Refills: 0    Associated Diagnoses: Anemia due to blood loss, acute      predniSONE (DELTASONE) 5 MG tablet Take 8 tablets (40 mg) by mouth daily for 7 days, THEN 6 tablets (30 mg) daily for 7 days, THEN 4 tablets (20 mg) daily for 7 days, THEN 3 tablets (15 mg) daily for 7 days, THEN 2 tablets (10 mg) daily for 7 days, THEN 1 tablet (5 mg) daily for 7 days.  Qty: 168 tablet, Refills: 0    Associated Diagnoses: Anemia due to blood loss, acute         CONTINUE these medications which have CHANGED    Details   amylase-lipase-protease (CREON 6) 4731-43534-10642 units  CPEP Take 4 capsules by mouth 3 times daily (with meals)  Qty: 360 capsule, Refills: 0    Associated Diagnoses: Anemia due to blood loss, acute           Allergies   Allergies   Allergen Reactions     Blood Transfusion Related (Informational Only)      Patient has a history of a clinically significant antibody against RBC antigens.  A delay in compatible RBCs may occur.

## 2020-12-18 NOTE — PLAN OF CARE
A/Ox4. VSS on RA. Denies pain. Ind in room. PIV SL. PO Mesalamine initiated today. So far has had 3 stools today since 7am, per pt there has been a little more blood in them today. Hgb 9.4, next check in AM. Low fiber diet. Pt self administering creon as he eats throughout day - see patient care order. Discharge pending, possibly tomorrow pending stools.

## 2020-12-18 NOTE — PLAN OF CARE
Patient A/Ox4. VSS on room air. Denies pain. Up independently. Tolerating low fiber diet. 2 BMs this shift. Hgb 9.6. Discharge home this afternoon. Will follow-up with GI and pancreas specialist - patient has already made follow-up appointments.      Discharging home at 3:30pm. Meds filled and sent home with patient. Reviewed all new medication - patient educated on steroid taper. PIV removed.

## 2020-12-18 NOTE — PROGRESS NOTES
A&O. Up IND. VSS. Low fiber diet. No C/O pain. Reported 1 soft loose stool this shift, no blood noted. PIV SL. Slept between cares. Possible discharge today.

## 2021-02-04 ENCOUNTER — HOSPITAL ENCOUNTER (OUTPATIENT)
Dept: MRI IMAGING | Facility: CLINIC | Age: 31
Discharge: HOME | End: 2021-02-04
Attending: INTERNAL MEDICINE | Admitting: INTERNAL MEDICINE
Payer: COMMERCIAL

## 2021-02-04 DIAGNOSIS — Z87.19 HISTORY OF PANCREATITIS: ICD-10-CM

## 2021-02-04 DIAGNOSIS — K86.89 PANCREATIC INSUFFICIENCY: ICD-10-CM

## 2021-02-04 DIAGNOSIS — K51.00 ULCERATIVE PANCOLITIS (H): ICD-10-CM

## 2021-02-04 PROCEDURE — A9585 GADOBUTROL INJECTION: HCPCS | Performed by: INTERNAL MEDICINE

## 2021-02-04 PROCEDURE — 74183 MRI ABD W/O CNTR FLWD CNTR: CPT

## 2021-02-04 PROCEDURE — 255N000002 HC RX 255 OP 636: Performed by: INTERNAL MEDICINE

## 2021-02-04 RX ORDER — GADOBUTROL 604.72 MG/ML
6 INJECTION INTRAVENOUS ONCE
Status: COMPLETED | OUTPATIENT
Start: 2021-02-04 | End: 2021-02-04

## 2021-02-04 RX ADMIN — GADOBUTROL 6 ML: 604.72 INJECTION INTRAVENOUS at 17:23

## 2021-02-05 ENCOUNTER — MEDICAL CORRESPONDENCE (OUTPATIENT)
Dept: HEALTH INFORMATION MANAGEMENT | Facility: CLINIC | Age: 31
End: 2021-02-05

## 2021-02-08 ENCOUNTER — TELEPHONE (OUTPATIENT)
Dept: CONSULT | Facility: CLINIC | Age: 31
End: 2021-02-08

## 2021-02-09 NOTE — TELEPHONE ENCOUNTER
Received referral for pt to be seen for idiopathic chronic pancreatitis. LVM asking patient to call me back directly to schedule GC only appt with Cordelia Golden.

## 2021-02-15 NOTE — TELEPHONE ENCOUNTER
2nd message left for patient to call me back directly to schedule GC only appt with Cordelia Golden.

## 2021-03-06 ENCOUNTER — HEALTH MAINTENANCE LETTER (OUTPATIENT)
Age: 31
End: 2021-03-06

## 2021-10-09 ENCOUNTER — HEALTH MAINTENANCE LETTER (OUTPATIENT)
Age: 31
End: 2021-10-09

## 2022-03-20 ENCOUNTER — HEALTH MAINTENANCE LETTER (OUTPATIENT)
Age: 32
End: 2022-03-20

## 2022-09-11 ENCOUNTER — HEALTH MAINTENANCE LETTER (OUTPATIENT)
Age: 32
End: 2022-09-11

## 2023-04-30 ENCOUNTER — HEALTH MAINTENANCE LETTER (OUTPATIENT)
Age: 33
End: 2023-04-30